# Patient Record
Sex: FEMALE | Race: WHITE | Employment: OTHER | URBAN - METROPOLITAN AREA
[De-identification: names, ages, dates, MRNs, and addresses within clinical notes are randomized per-mention and may not be internally consistent; named-entity substitution may affect disease eponyms.]

---

## 2017-11-06 ENCOUNTER — OFFICE VISIT (OUTPATIENT)
Dept: FAMILY MEDICINE CLINIC | Age: 62
End: 2017-11-06

## 2017-11-06 ENCOUNTER — TELEPHONE (OUTPATIENT)
Dept: FAMILY MEDICINE CLINIC | Age: 62
End: 2017-11-06

## 2017-11-06 VITALS
BODY MASS INDEX: 28.44 KG/M2 | WEIGHT: 192 LBS | SYSTOLIC BLOOD PRESSURE: 128 MMHG | OXYGEN SATURATION: 98 % | TEMPERATURE: 96.7 F | HEART RATE: 68 BPM | DIASTOLIC BLOOD PRESSURE: 80 MMHG | RESPIRATION RATE: 14 BRPM | HEIGHT: 69 IN

## 2017-11-06 DIAGNOSIS — E78.2 MIXED HYPERLIPIDEMIA: Primary | ICD-10-CM

## 2017-11-06 DIAGNOSIS — Z13.1 SCREENING FOR DIABETES MELLITUS: ICD-10-CM

## 2017-11-06 DIAGNOSIS — N95.1 MENOPAUSAL SYMPTOMS: ICD-10-CM

## 2017-11-06 DIAGNOSIS — E78.2 MIXED HYPERLIPIDEMIA: ICD-10-CM

## 2017-11-06 DIAGNOSIS — Z98.890 HISTORY OF FACELIFT: Primary | ICD-10-CM

## 2017-11-06 DIAGNOSIS — L98.8 AGE-RELATED FACIAL WRINKLES: ICD-10-CM

## 2017-11-06 DIAGNOSIS — Z12.31 ENCOUNTER FOR SCREENING MAMMOGRAM FOR BREAST CANCER: ICD-10-CM

## 2017-11-06 LAB
ALBUMIN SERPL-MCNC: 4.7 G/DL (ref 3.9–4.9)
ALP BLD-CCNC: 53 U/L (ref 40–130)
ALT SERPL-CCNC: 18 U/L (ref 0–33)
ANION GAP SERPL CALCULATED.3IONS-SCNC: 16 MEQ/L (ref 7–13)
AST SERPL-CCNC: 22 U/L (ref 0–35)
BILIRUB SERPL-MCNC: 0.4 MG/DL (ref 0–1.2)
BUN BLDV-MCNC: 11 MG/DL (ref 8–23)
CALCIUM SERPL-MCNC: 10 MG/DL (ref 8.6–10.2)
CHLORIDE BLD-SCNC: 103 MEQ/L (ref 98–107)
CHOLESTEROL, TOTAL: 237 MG/DL (ref 0–199)
CO2: 24 MEQ/L (ref 22–29)
CREAT SERPL-MCNC: 0.6 MG/DL (ref 0.5–0.9)
GFR AFRICAN AMERICAN: >60
GFR NON-AFRICAN AMERICAN: >60
GLOBULIN: 3 G/DL (ref 2.3–3.5)
GLUCOSE BLD-MCNC: 94 MG/DL (ref 74–109)
HCT VFR BLD CALC: 43.1 % (ref 37–47)
HDLC SERPL-MCNC: 86 MG/DL (ref 40–59)
HEMOGLOBIN: 14.5 G/DL (ref 12–16)
LDL CHOLESTEROL CALCULATED: 122 MG/DL (ref 0–129)
MCH RBC QN AUTO: 30.7 PG (ref 27–31.3)
MCHC RBC AUTO-ENTMCNC: 33.7 % (ref 33–37)
MCV RBC AUTO: 91.2 FL (ref 82–100)
PDW BLD-RTO: 13.8 % (ref 11.5–14.5)
PLATELET # BLD: 205 K/UL (ref 130–400)
POTASSIUM SERPL-SCNC: 5.1 MEQ/L (ref 3.5–5.1)
RBC # BLD: 4.72 M/UL (ref 4.2–5.4)
SODIUM BLD-SCNC: 143 MEQ/L (ref 132–144)
TOTAL PROTEIN: 7.7 G/DL (ref 6.4–8.1)
TRIGL SERPL-MCNC: 145 MG/DL (ref 0–200)
WBC # BLD: 5.4 K/UL (ref 4.8–10.8)

## 2017-11-06 PROCEDURE — G8484 FLU IMMUNIZE NO ADMIN: HCPCS | Performed by: NURSE PRACTITIONER

## 2017-11-06 PROCEDURE — G8427 DOCREV CUR MEDS BY ELIG CLIN: HCPCS | Performed by: NURSE PRACTITIONER

## 2017-11-06 PROCEDURE — 3017F COLORECTAL CA SCREEN DOC REV: CPT | Performed by: NURSE PRACTITIONER

## 2017-11-06 PROCEDURE — G8419 CALC BMI OUT NRM PARAM NOF/U: HCPCS | Performed by: NURSE PRACTITIONER

## 2017-11-06 PROCEDURE — 4004F PT TOBACCO SCREEN RCVD TLK: CPT | Performed by: NURSE PRACTITIONER

## 2017-11-06 PROCEDURE — 3014F SCREEN MAMMO DOC REV: CPT | Performed by: NURSE PRACTITIONER

## 2017-11-06 PROCEDURE — 99203 OFFICE O/P NEW LOW 30 MIN: CPT | Performed by: NURSE PRACTITIONER

## 2017-11-06 RX ORDER — ESTRADIOL 0.1 MG/D
1 FILM, EXTENDED RELEASE TRANSDERMAL
Qty: 13 PATCH | Refills: 1 | Status: SHIPPED | OUTPATIENT
Start: 2017-11-06 | End: 2017-11-09 | Stop reason: SDUPTHER

## 2017-11-06 RX ORDER — OMEGA-3-ACID ETHYL ESTERS 1 G/1
2 CAPSULE, LIQUID FILLED ORAL 2 TIMES DAILY
Qty: 160 CAPSULE | Refills: 1 | Status: SHIPPED | OUTPATIENT
Start: 2017-11-06 | End: 2017-11-06 | Stop reason: SDUPTHER

## 2017-11-06 RX ORDER — TRETINOIN 1 MG/G
CREAM TOPICAL
Qty: 45 G | Refills: 3 | Status: SHIPPED
Start: 2017-11-06 | End: 2020-04-06 | Stop reason: CLARIF

## 2017-11-06 RX ORDER — ESTRADIOL 0.1 MG/D
1 FILM, EXTENDED RELEASE TRANSDERMAL
Qty: 24 PATCH | Refills: 1 | Status: SHIPPED | OUTPATIENT
Start: 2017-11-06 | End: 2017-11-06 | Stop reason: SDUPTHER

## 2017-11-06 RX ORDER — OMEGA-3-ACID ETHYL ESTERS 1 G/1
2 CAPSULE, LIQUID FILLED ORAL 2 TIMES DAILY
Qty: 180 CAPSULE | Refills: 1 | Status: SHIPPED | OUTPATIENT
Start: 2017-11-06 | End: 2017-11-08 | Stop reason: SDUPTHER

## 2017-11-06 ASSESSMENT — PATIENT HEALTH QUESTIONNAIRE - PHQ9
SUM OF ALL RESPONSES TO PHQ9 QUESTIONS 1 & 2: 0
1. LITTLE INTEREST OR PLEASURE IN DOING THINGS: 0
2. FEELING DOWN, DEPRESSED OR HOPELESS: 0
SUM OF ALL RESPONSES TO PHQ QUESTIONS 1-9: 0

## 2017-11-06 NOTE — TELEPHONE ENCOUNTER
Pt is requesting th miguel and José Chavira go to [address] instead of [facility]. Pt has enough meds on hand to cover until mail order arrives. Thanks.

## 2017-11-08 DIAGNOSIS — E78.2 MIXED HYPERLIPIDEMIA: ICD-10-CM

## 2017-11-08 RX ORDER — OMEGA-3-ACID ETHYL ESTERS 1 G/1
2 CAPSULE, LIQUID FILLED ORAL 2 TIMES DAILY
Qty: 360 CAPSULE | Refills: 1 | Status: SHIPPED | OUTPATIENT
Start: 2017-11-08 | End: 2018-05-24 | Stop reason: SDUPTHER

## 2017-11-08 ASSESSMENT — ENCOUNTER SYMPTOMS
RESPIRATORY NEGATIVE: 1
RECTAL PAIN: 0
TROUBLE SWALLOWING: 0
ABDOMINAL PAIN: 0
GASTROINTESTINAL NEGATIVE: 1
VOICE CHANGE: 0
DIARRHEA: 0
CONSTIPATION: 0
ANAL BLEEDING: 0
EYES NEGATIVE: 1
SHORTNESS OF BREATH: 0
COLOR CHANGE: 0
BLOOD IN STOOL: 0
ALLERGIC/IMMUNOLOGIC NEGATIVE: 1

## 2017-11-08 NOTE — PROGRESS NOTES
Subjective  Lakeshia Correa, 60 y.o. female presents today with:  Chief Complaint   Patient presents with   Ifrah Tobias Establish Care     refill on meds        Establish care  Presents today to establish care. Previous PCP was in [place]. she chose to come here because she is living back in the area. She has a history of hyperlipidemia-  Diet controlled. Face lift-  Uses retina A daily. - and vasomotor menopausal symptoms-  Controlled with estradiol patch. Is currently taking all medications at home. Due for Mammogram.  Overall is doing well. Has NO other questions or concerns at this time. Review of Systems   Constitutional: Negative. Negative for activity change, appetite change, fatigue and unexpected weight change. HENT: Negative. Negative for dental problem, nosebleeds, trouble swallowing and voice change. Eyes: Negative. Negative for visual disturbance. Respiratory: Negative. Negative for shortness of breath. Cardiovascular: Negative. Negative for chest pain, palpitations and leg swelling. Gastrointestinal: Negative. Negative for abdominal pain, anal bleeding, blood in stool, constipation, diarrhea and rectal pain. Endocrine: Negative. Negative for cold intolerance, heat intolerance, polydipsia, polyphagia and polyuria. Genitourinary: Negative. Musculoskeletal: Negative. Skin: Negative. Negative for color change and rash. Allergic/Immunologic: Negative. Neurological: Negative. Negative for dizziness, syncope, weakness and headaches. Hematological: Negative. Negative for adenopathy. Does not bruise/bleed easily. Psychiatric/Behavioral: Negative. Negative for dysphoric mood and sleep disturbance. The patient is not nervous/anxious. History reviewed. No pertinent past medical history.   Past Surgical History:   Procedure Laterality Date    APPENDECTOMY  64    NIMESH AND BSO  1995     Social History     Social History    Marital status:  Spouse name: N/A    Number of children: N/A    Years of education: N/A     Occupational History    Not on file. Social History Main Topics    Smoking status: Not on file    Smokeless tobacco: Not on file    Alcohol use Not on file    Drug use: Unknown    Sexual activity: Not on file     Other Topics Concern    Not on file     Social History Narrative    No narrative on file     Family History   Problem Relation Age of Onset    Osteoarthritis Mother     Migraines Mother     Heart Failure Father     Osteoarthritis Father      No Known Allergies  Current Outpatient Prescriptions   Medication Sig Dispense Refill    tretinoin (RETIN-A) 0.1 % cream Apply topically nightly. 45 g 3    omega-3 acid ethyl esters (LOVAZA) 1 g capsule Take 2 capsules by mouth 2 times daily 360 capsule 1    estradiol (VIVELLE) 0.1 MG/24HR Place 1 patch onto the skin Twice a Week 13 patch 1     No current facility-administered medications for this visit. PMH, Surgical Hx, Family Hx, and Social Hx reviewed and updated. Health Maintenance reviewed. Objective    Vitals:    11/06/17 0913   BP: 128/80   Site: Right Arm   Position: Sitting   Cuff Size: Medium Adult   Pulse: 68   Resp: 14   Temp: 96.7 °F (35.9 °C)   TempSrc: Tympanic   SpO2: 98%   Weight: 192 lb (87.1 kg)   Height: 5' 9\" (1.753 m)       Physical Exam   Constitutional: She is oriented to person, place, and time. Vital signs are normal. She appears well-developed and well-nourished. She is cooperative. Non-toxic appearance. She does not have a sickly appearance. She does not appear ill. No distress. HENT:   Head: Normocephalic and atraumatic. Right Ear: Tympanic membrane, external ear and ear canal normal.   Left Ear: Tympanic membrane, external ear and ear canal normal.   Nose: Nose normal.   Mouth/Throat: Oropharynx is clear and moist.   Eyes: Conjunctivae, EOM and lids are normal. Pupils are equal, round, and reactive to light.  Right eye exhibits no nystagmus. Left eye exhibits no nystagmus. Neck: Trachea normal and normal range of motion. Neck supple. No JVD present. Carotid bruit is not present. No tracheal deviation present. No thyroid mass and no thyromegaly present. Cardiovascular: Normal rate, regular rhythm, S1 normal, S2 normal, normal heart sounds and intact distal pulses. Exam reveals no gallop. No murmur heard. Pulmonary/Chest: Effort normal and breath sounds normal. No accessory muscle usage. No respiratory distress. She has no decreased breath sounds. She has no wheezes. She has no rhonchi. She has no rales. Abdominal: Soft. Bowel sounds are normal. She exhibits no distension, no ascites and no mass. There is no splenomegaly or hepatomegaly. There is no tenderness. There is no CVA tenderness. No hernia. Lymphadenopathy:        Head (right side): No submandibular, no tonsillar, no preauricular and no posterior auricular adenopathy present. Head (left side): No submandibular, no tonsillar, no preauricular and no posterior auricular adenopathy present. She has no cervical adenopathy. She has no axillary adenopathy. Neurological: She is alert and oriented to person, place, and time. She has normal strength and normal reflexes. She displays no atrophy and no tremor. No cranial nerve deficit or sensory deficit. She exhibits normal muscle tone. She displays a negative Romberg sign. She displays no seizure activity. Coordination and gait normal. GCS eye subscore is 4. GCS verbal subscore is 5. GCS motor subscore is 6. Skin: Skin is warm, dry and intact. She is not diaphoretic. No pallor. Psychiatric: She has a normal mood and affect. Her speech is normal.       Assessment & Plan   Leann Zarco was seen today for establish care. Diagnoses and all orders for this visit:    Mixed hyperlipidemia  -     Discontinue: omega-3 acid ethyl esters (LOVAZA) 1 g capsule;  Take 2 capsules by mouth 2 times daily  -     Lipid Panel; Future  -     Comprehensive Metabolic Panel; Future  -     CBC; Future    Menopausal symptoms  -     Discontinue: estradiol (VIVELLE) 0.1 MG/24HR; Place 1 patch onto the skin Twice a Week  -     Comprehensive Metabolic Panel; Future  -     CBC; Future    Encounter for screening mammogram for breast cancer  -     [facility] Digital Screen Bilateral [[id number]]; Future    Screening for diabetes mellitus  -     [facility] Digital Screen Bilateral [[id number]]; Future    Other orders  -     Cancel: ZOILA Digital Screen Bilateral [[id number]]; Future  -     tretinoin (RETIN-A) 0.1 % cream; Apply topically nightly. Orders Placed This Encounter   Procedures    ZOILA Digital Screen Bilateral [[id number]]     Standing Status:   Future     Standing Expiration Date:   11/3/2018    Lipid Panel     Standing Status:   Future     Number of Occurrences:   1     Standing Expiration Date:   11/6/2018     Order Specific Question:   Is Patient Fasting?/# of Hours     Answer:   y    Comprehensive Metabolic Panel     Standing Status:   Future     Number of Occurrences:   1     Standing Expiration Date:   11/6/2018    CBC     Standing Status:   Future     Number of Occurrences:   1     Standing Expiration Date:   11/6/2018     Orders Placed This Encounter   Medications    DISCONTD: estradiol (VIVELLE) 0.1 MG/24HR     Sig: Place 1 patch onto the skin Twice a Week     Dispense:  24 patch     Refill:  1    DISCONTD: omega-3 acid ethyl esters (LOVAZA) 1 g capsule     Sig: Take 2 capsules by mouth 2 times daily     Dispense:  160 capsule     Refill:  1    tretinoin (RETIN-A) 0.1 % cream     Sig: Apply topically nightly. Dispense:  45 g     Refill:  3     Medications Discontinued During This Encounter   Medication Reason    estradiol (VIVELLE) 0.1 MG/24HR Reorder     Return for [place]. Reviewed with the patient: current clinical status, medications, activities and diet.      Side effects, adverse effects of the medication prescribed today, as well

## 2017-11-20 ENCOUNTER — HOSPITAL ENCOUNTER (OUTPATIENT)
Dept: WOMENS IMAGING | Age: 62
Discharge: HOME | End: 2017-11-20
Payer: COMMERCIAL

## 2017-11-20 DIAGNOSIS — Z12.31 ENCOUNTER FOR SCREENING MAMMOGRAM FOR BREAST CANCER: ICD-10-CM

## 2017-11-20 DIAGNOSIS — Z13.1 SCREENING FOR DIABETES MELLITUS: ICD-10-CM

## 2017-11-20 PROCEDURE — G0202 SCR MAMMO BI INCL CAD: HCPCS

## 2017-11-28 DIAGNOSIS — R92.8 ABNORMAL MAMMOGRAM OF RIGHT BREAST: Primary | ICD-10-CM

## 2017-12-01 ENCOUNTER — HOSPITAL ENCOUNTER (OUTPATIENT)
Dept: ULTRASOUND IMAGING | Age: 62
End: 2017-12-01
Payer: COMMERCIAL

## 2017-12-01 ENCOUNTER — HOSPITAL ENCOUNTER (OUTPATIENT)
Dept: WOMENS IMAGING | Age: 62
Discharge: HOME | End: 2017-12-01
Payer: COMMERCIAL

## 2017-12-01 DIAGNOSIS — R92.8 ABNORMAL MAMMOGRAM OF RIGHT BREAST: ICD-10-CM

## 2017-12-01 PROCEDURE — G0206 DX MAMMO INCL CAD UNI: HCPCS

## 2017-12-01 NOTE — LETTER
[address] [facility]  [address] [address]  Phone: [address] [facility] room 2        December 1, 2017    [address] [place] [id number]      Dear Nico Duron:    According to the radiologists interpretation, your mammogram did not show any significant findings. Please remember that some cancers (about 10-15%) cannot be found by mammography alone, and that early detection requires a combination of monthly self-examination, yearly physical examination, and periodic mammography. The American Cancer Society Guidelines recommend screening mammograms and physical breast examinations every year beginning at the age of 36. Please continue regular breast self-examinations and report any changes that concern you, even before your next appointment. If you have any further questions, please don't hesitate to call the office.     Sincerely,       Kwabena Borjas MD

## 2018-02-26 ENCOUNTER — OFFICE VISIT (OUTPATIENT)
Dept: FAMILY MEDICINE CLINIC | Age: 63
End: 2018-02-26
Payer: COMMERCIAL

## 2018-02-26 VITALS
DIASTOLIC BLOOD PRESSURE: 70 MMHG | HEART RATE: 77 BPM | OXYGEN SATURATION: 97 % | SYSTOLIC BLOOD PRESSURE: 118 MMHG | RESPIRATION RATE: 14 BRPM | WEIGHT: 162 LBS | BODY MASS INDEX: 23.99 KG/M2 | HEIGHT: 69 IN | TEMPERATURE: 97.9 F

## 2018-02-26 DIAGNOSIS — J40 BRONCHITIS: ICD-10-CM

## 2018-02-26 DIAGNOSIS — B96.89 ACUTE BACTERIAL SINUSITIS: Primary | ICD-10-CM

## 2018-02-26 DIAGNOSIS — R09.81 SINUS CONGESTION: ICD-10-CM

## 2018-02-26 DIAGNOSIS — J01.90 ACUTE BACTERIAL SINUSITIS: Primary | ICD-10-CM

## 2018-02-26 LAB
INFLUENZA A ANTIBODY: NORMAL
INFLUENZA B ANTIBODY: NORMAL

## 2018-02-26 PROCEDURE — G8420 CALC BMI NORM PARAMETERS: HCPCS | Performed by: NURSE PRACTITIONER

## 2018-02-26 PROCEDURE — 1036F TOBACCO NON-USER: CPT | Performed by: NURSE PRACTITIONER

## 2018-02-26 PROCEDURE — G8484 FLU IMMUNIZE NO ADMIN: HCPCS | Performed by: NURSE PRACTITIONER

## 2018-02-26 PROCEDURE — 3017F COLORECTAL CA SCREEN DOC REV: CPT | Performed by: NURSE PRACTITIONER

## 2018-02-26 PROCEDURE — 87804 INFLUENZA ASSAY W/OPTIC: CPT | Performed by: NURSE PRACTITIONER

## 2018-02-26 PROCEDURE — 3014F SCREEN MAMMO DOC REV: CPT | Performed by: NURSE PRACTITIONER

## 2018-02-26 PROCEDURE — 99213 OFFICE O/P EST LOW 20 MIN: CPT | Performed by: NURSE PRACTITIONER

## 2018-02-26 PROCEDURE — G8427 DOCREV CUR MEDS BY ELIG CLIN: HCPCS | Performed by: NURSE PRACTITIONER

## 2018-02-26 RX ORDER — GUAIFENESIN AND CODEINE PHOSPHATE 100; 10 MG/5ML; MG/5ML
5 SOLUTION ORAL EVERY 6 HOURS PRN
Qty: 180 ML | Refills: 0 | Status: SHIPPED | OUTPATIENT
Start: 2018-02-26 | End: 2018-02-26 | Stop reason: SDUPTHER

## 2018-02-26 RX ORDER — PREDNISONE 20 MG/1
20 TABLET ORAL DAILY
Qty: 5 TABLET | Refills: 0 | Status: SHIPPED | OUTPATIENT
Start: 2018-02-26 | End: 2018-02-26 | Stop reason: SDUPTHER

## 2018-02-26 RX ORDER — FLUCONAZOLE 150 MG/1
150 TABLET ORAL ONCE
Qty: 1 TABLET | Refills: 0 | Status: SHIPPED | OUTPATIENT
Start: 2018-02-26 | End: 2018-02-26

## 2018-02-26 RX ORDER — PREDNISONE 20 MG/1
20 TABLET ORAL DAILY
Qty: 5 TABLET | Refills: 0 | Status: SHIPPED | OUTPATIENT
Start: 2018-02-26 | End: 2018-03-03

## 2018-02-26 RX ORDER — GUAIFENESIN AND CODEINE PHOSPHATE 100; 10 MG/5ML; MG/5ML
5 SOLUTION ORAL EVERY 6 HOURS PRN
Qty: 180 ML | Refills: 0 | Status: SHIPPED | OUTPATIENT
Start: 2018-02-26 | End: 2018-03-05

## 2018-03-05 ASSESSMENT — ENCOUNTER SYMPTOMS
SHORTNESS OF BREATH: 0
COUGH: 1
WHEEZING: 1
RHINORRHEA: 1
SINUS PRESSURE: 1
SORE THROAT: 0
HEMOPTYSIS: 0
HOARSE VOICE: 0
HEARTBURN: 0
SWOLLEN GLANDS: 0

## 2018-03-05 NOTE — PROGRESS NOTES
no maxillary sinus tenderness. Left sinus exhibits frontal sinus tenderness. Left sinus exhibits no maxillary sinus tenderness. Mouth/Throat: Uvula is midline and mucous membranes are normal. Posterior oropharyngeal erythema present. No oropharyngeal exudate. Eyes: Conjunctivae are normal. Pupils are equal, round, and reactive to light. Neck: Neck supple. Cardiovascular: Normal rate, regular rhythm and intact distal pulses. Pulmonary/Chest: Effort normal and breath sounds normal. No respiratory distress. She has no wheezes. She has no rales. Lymphadenopathy:     She has cervical adenopathy. Neurological: She is alert and oriented to person, place, and time. Skin: Skin is warm and dry. No rash noted. Psychiatric: She has a normal mood and affect. Assessment & Plan   Yenni Sher was seen today for congestion, cough and fever. Diagnoses and all orders for this visit:    Acute bacterial sinusitis  -     Discontinue: predniSONE (DELTASONE) 20 MG tablet; Take 1 tablet by mouth daily for 5 days  -     Discontinue: guaiFENesin-codeine (CHERATUSSIN AC) 100-10 MG/5ML syrup; Take 5 mLs by mouth every 6 hours as needed for Cough for up to 7 days. -     predniSONE (DELTASONE) 20 MG tablet; Take 1 tablet by mouth daily for 5 days  -     guaiFENesin-codeine (CHERATUSSIN AC) 100-10 MG/5ML syrup; Take 5 mLs by mouth every 6 hours as needed for Cough for up to 7 days. Bronchitis  -     Discontinue: predniSONE (DELTASONE) 20 MG tablet; Take 1 tablet by mouth daily for 5 days  -     Discontinue: guaiFENesin-codeine (CHERATUSSIN AC) 100-10 MG/5ML syrup; Take 5 mLs by mouth every 6 hours as needed for Cough for up to 7 days. -     predniSONE (DELTASONE) 20 MG tablet; Take 1 tablet by mouth daily for 5 days  -     guaiFENesin-codeine (CHERATUSSIN AC) 100-10 MG/5ML syrup; Take 5 mLs by mouth every 6 hours as needed for Cough for up to 7 days.     Sinus congestion  -     POCT Influenza A/B  -     XR CHEST STANDARD (2 VW); Future    Other orders  -     fluconazole (DIFLUCAN) 150 MG tablet; Take 1 tablet by mouth once for 1 dose      Orders Placed This Encounter   Procedures    XR CHEST STANDARD (2 VW)     Standing Status:   Future     Number of Occurrences:   1     Standing Expiration Date:   2/26/2019     Order Specific Question:   Reason for exam:     Answer:   chest congestion    POCT Influenza A/B     Orders Placed This Encounter   Medications    DISCONTD: predniSONE (DELTASONE) 20 MG tablet     Sig: Take 1 tablet by mouth daily for 5 days     Dispense:  5 tablet     Refill:  0    DISCONTD: guaiFENesin-codeine (CHERATUSSIN AC) 100-10 MG/5ML syrup     Sig: Take 5 mLs by mouth every 6 hours as needed for Cough for up to 7 days. Dispense:  180 mL     Refill:  0    fluconazole (DIFLUCAN) 150 MG tablet     Sig: Take 1 tablet by mouth once for 1 dose     Dispense:  1 tablet     Refill:  0    predniSONE (DELTASONE) 20 MG tablet     Sig: Take 1 tablet by mouth daily for 5 days     Dispense:  5 tablet     Refill:  0    guaiFENesin-codeine (CHERATUSSIN AC) 100-10 MG/5ML syrup     Sig: Take 5 mLs by mouth every 6 hours as needed for Cough for up to 7 days. Dispense:  180 mL     Refill:  0     Medications Discontinued During This Encounter   Medication Reason    predniSONE (DELTASONE) 20 MG tablet Reorder    guaiFENesin-codeine (CHERATUSSIN AC) 100-10 MG/5ML syrup Reorder     Return if symptoms worsen or fail to improve. Reviewed with the patient: current clinical status, medications, activities and diet. Side effects, adverse effects of the medication prescribed today, as well as treatment plan/ rationale and result expectations have been discussed with the patient who expresses understanding and desires to proceed. Close follow up to evaluate treatment results and for coordination of care.   I have reviewed the patient's medical history in detail and updated the computerized patient

## 2018-05-24 DIAGNOSIS — E78.2 MIXED HYPERLIPIDEMIA: ICD-10-CM

## 2018-05-25 RX ORDER — OMEGA-3-ACID ETHYL ESTERS 1 G/1
CAPSULE, LIQUID FILLED ORAL
Qty: 360 CAPSULE | Refills: 1 | Status: SHIPPED | OUTPATIENT
Start: 2018-05-25 | End: 2018-12-01 | Stop reason: SDUPTHER

## 2018-12-01 DIAGNOSIS — E78.2 MIXED HYPERLIPIDEMIA: ICD-10-CM

## 2018-12-04 RX ORDER — OMEGA-3-ACID ETHYL ESTERS 1 G/1
CAPSULE, LIQUID FILLED ORAL
Qty: 360 CAPSULE | Refills: 1 | Status: SHIPPED | OUTPATIENT
Start: 2018-12-04 | End: 2019-06-17 | Stop reason: SDUPTHER

## 2019-06-17 DIAGNOSIS — E78.2 MIXED HYPERLIPIDEMIA: ICD-10-CM

## 2019-06-17 RX ORDER — OMEGA-3-ACID ETHYL ESTERS 1 G/1
CAPSULE, LIQUID FILLED ORAL
Qty: 360 CAPSULE | Refills: 1 | Status: SHIPPED | OUTPATIENT
Start: 2019-06-17 | End: 2020-04-06 | Stop reason: CLARIF

## 2019-06-17 NOTE — TELEPHONE ENCOUNTER
Pharmacy requesting medication refill. Please approve or deny this request.    Rx requested:  Requested Prescriptions     Pending Prescriptions Disp Refills    omega-3 acid ethyl esters (LOVAZA) 1 g capsule [Pharmacy Med Name: OMEGA 3 ACID ETHYL PATRICIA CAPS 1GM] 360 capsule 1     Sig: TAKE 2 CAPSULES TWICE A DAY         Last Office Visit:   2/26/2018      Next Visit Date:  No future appointments.

## 2019-06-20 DIAGNOSIS — N95.1 MENOPAUSAL SYMPTOMS: ICD-10-CM

## 2019-06-20 RX ORDER — ESTRADIOL 0.1 MG/D
FILM, EXTENDED RELEASE TRANSDERMAL
Qty: 24 PATCH | Refills: 0 | Status: SHIPPED | OUTPATIENT
Start: 2019-06-20 | End: 2020-03-31 | Stop reason: SDUPTHER

## 2019-06-20 NOTE — TELEPHONE ENCOUNTER
Pt requesting medication refill.      Rx requested:  Requested Prescriptions     Pending Prescriptions Disp Refills    estradiol (VIVELLE) 0.1 MG/24HR 24 patch 3     Sig: PLACE 1 PATCH ON THE SKIN TWICE A WEEK       Last Office Visit:   Visit date not found    Last Tox screen:    N/A    Last Medication contract:    N/A    Next Visit Date:  Future Appointments   Date Time Provider Mirlande eBltrán   7/17/2019  3:00 PM Ryan Duckworth  [place], [place]

## 2019-07-17 ENCOUNTER — OFFICE VISIT (OUTPATIENT)
Dept: FAMILY MEDICINE CLINIC | Age: 64
End: 2019-07-17
Payer: MEDICARE

## 2019-07-17 VITALS
BODY MASS INDEX: 24.88 KG/M2 | SYSTOLIC BLOOD PRESSURE: 134 MMHG | TEMPERATURE: 97.5 F | WEIGHT: 168 LBS | RESPIRATION RATE: 15 BRPM | HEART RATE: 56 BPM | HEIGHT: 69 IN | DIASTOLIC BLOOD PRESSURE: 82 MMHG | OXYGEN SATURATION: 98 %

## 2019-07-17 DIAGNOSIS — R03.0 ELEVATED BLOOD PRESSURE READING IN OFFICE WITHOUT DIAGNOSIS OF HYPERTENSION: ICD-10-CM

## 2019-07-17 DIAGNOSIS — E78.5 HYPERLIPIDEMIA, UNSPECIFIED HYPERLIPIDEMIA TYPE: ICD-10-CM

## 2019-07-17 DIAGNOSIS — Z12.11 SCREENING FOR COLON CANCER: ICD-10-CM

## 2019-07-17 DIAGNOSIS — Z78.0 POST-MENOPAUSAL: Primary | ICD-10-CM

## 2019-07-17 DIAGNOSIS — E55.9 VITAMIN D DEFICIENCY: ICD-10-CM

## 2019-07-17 DIAGNOSIS — Z12.39 SCREENING FOR BREAST CANCER: ICD-10-CM

## 2019-07-17 PROCEDURE — 1036F TOBACCO NON-USER: CPT | Performed by: INTERNAL MEDICINE

## 2019-07-17 PROCEDURE — 99213 OFFICE O/P EST LOW 20 MIN: CPT | Performed by: INTERNAL MEDICINE

## 2019-07-17 PROCEDURE — 1090F PRES/ABSN URINE INCON ASSESS: CPT | Performed by: INTERNAL MEDICINE

## 2019-07-17 PROCEDURE — 3017F COLORECTAL CA SCREEN DOC REV: CPT | Performed by: INTERNAL MEDICINE

## 2019-07-17 PROCEDURE — G8420 CALC BMI NORM PARAMETERS: HCPCS | Performed by: INTERNAL MEDICINE

## 2019-07-17 PROCEDURE — G8427 DOCREV CUR MEDS BY ELIG CLIN: HCPCS | Performed by: INTERNAL MEDICINE

## 2019-07-17 PROCEDURE — 1123F ACP DISCUSS/DSCN MKR DOCD: CPT | Performed by: INTERNAL MEDICINE

## 2019-07-17 PROCEDURE — G8400 PT W/DXA NO RESULTS DOC: HCPCS | Performed by: INTERNAL MEDICINE

## 2019-07-17 PROCEDURE — 4040F PNEUMOC VAC/ADMIN/RCVD: CPT | Performed by: INTERNAL MEDICINE

## 2019-07-17 ASSESSMENT — PATIENT HEALTH QUESTIONNAIRE - PHQ9
SUM OF ALL RESPONSES TO PHQ QUESTIONS 1-9: 0
1. LITTLE INTEREST OR PLEASURE IN DOING THINGS: 0
SUM OF ALL RESPONSES TO PHQ9 QUESTIONS 1 & 2: 0
SUM OF ALL RESPONSES TO PHQ QUESTIONS 1-9: 0
2. FEELING DOWN, DEPRESSED OR HOPELESS: 0

## 2019-07-17 ASSESSMENT — ENCOUNTER SYMPTOMS
EYE PAIN: 0
ABDOMINAL PAIN: 0
BACK PAIN: 0
SHORTNESS OF BREATH: 0

## 2019-07-17 NOTE — PROGRESS NOTES
Allergies  Current Outpatient Medications on File Prior to Visit   Medication Sig Dispense Refill    estradiol (VIVELLE) 0.1 MG/24HR PLACE 1 PATCH ON THE SKIN TWICE A WEEK 24 patch 0    omega-3 acid ethyl esters (LOVAZA) 1 g capsule TAKE 2 CAPSULES TWICE A  capsule 1    tretinoin (RETIN-A) 0.1 % cream Apply topically nightly. 45 g 3     No current facility-administered medications on file prior to visit. I have personally reviewed the ROS, PMH, PFH, and social history     Review of Systems   Constitutional: Negative for chills and fever. HENT: Negative for congestion. Eyes: Negative for pain. Respiratory: Negative for shortness of breath. Cardiovascular: Negative for chest pain. Gastrointestinal: Negative for abdominal pain. Genitourinary: Negative for hematuria. Musculoskeletal: Negative for back pain. Allergic/Immunologic: Negative for immunocompromised state. Neurological: Negative for headaches. Psychiatric/Behavioral: Negative for hallucinations. Objective:   /82 (Site: Left Upper Arm, Position: Sitting, Cuff Size: Large Adult)   Pulse 56   Temp 97.5 °F (36.4 °C) (Tympanic)   Resp 15   Ht 5' 9\" (1.753 m)   Wt 168 lb (76.2 kg)   SpO2 98%   BMI 24.81 kg/m²     Physical Exam   Constitutional: She is oriented to person, place, and time. She appears well-developed and well-nourished. HENT:   Head: Normocephalic. Eyes: Pupils are equal, round, and reactive to light. Neck: No tracheal deviation present. Cardiovascular: Normal rate, regular rhythm and normal heart sounds. Exam reveals no gallop and no friction rub. No murmur heard. Pulmonary/Chest: No respiratory distress. Abdominal: Soft. Bowel sounds are normal. She exhibits no distension. There is no rebound. Musculoskeletal: She exhibits no edema. Neurological: She is oriented to person, place, and time. Skin: Skin is warm and dry. Assessment:       Diagnosis Orders   1.

## 2019-07-18 DIAGNOSIS — E55.9 VITAMIN D DEFICIENCY: ICD-10-CM

## 2019-07-18 DIAGNOSIS — Z78.0 POST-MENOPAUSAL: ICD-10-CM

## 2019-07-18 DIAGNOSIS — R03.0 ELEVATED BLOOD PRESSURE READING IN OFFICE WITHOUT DIAGNOSIS OF HYPERTENSION: ICD-10-CM

## 2019-07-18 DIAGNOSIS — E78.5 HYPERLIPIDEMIA, UNSPECIFIED HYPERLIPIDEMIA TYPE: ICD-10-CM

## 2019-07-18 LAB
ALBUMIN SERPL-MCNC: 4.8 G/DL (ref 3.5–4.6)
ALP BLD-CCNC: 49 U/L (ref 40–130)
ALT SERPL-CCNC: 25 U/L (ref 0–33)
ANION GAP SERPL CALCULATED.3IONS-SCNC: 11 MEQ/L (ref 9–15)
AST SERPL-CCNC: 25 U/L (ref 0–35)
BASOPHILS ABSOLUTE: 0.1 K/UL (ref 0–0.2)
BASOPHILS RELATIVE PERCENT: 1 %
BILIRUB SERPL-MCNC: 0.4 MG/DL (ref 0.2–0.7)
BUN BLDV-MCNC: 13 MG/DL (ref 8–23)
CALCIUM SERPL-MCNC: 10.1 MG/DL (ref 8.5–9.9)
CHLORIDE BLD-SCNC: 104 MEQ/L (ref 95–107)
CHOLESTEROL, TOTAL: 250 MG/DL (ref 0–199)
CO2: 27 MEQ/L (ref 20–31)
CREAT SERPL-MCNC: 0.82 MG/DL (ref 0.5–0.9)
EOSINOPHILS ABSOLUTE: 0.2 K/UL (ref 0–0.7)
EOSINOPHILS RELATIVE PERCENT: 3.5 %
GFR AFRICAN AMERICAN: >60
GFR NON-AFRICAN AMERICAN: >60
GLOBULIN: 2.8 G/DL (ref 2.3–3.5)
GLUCOSE BLD-MCNC: 98 MG/DL (ref 70–99)
HCT VFR BLD CALC: 42.7 % (ref 37–47)
HDLC SERPL-MCNC: 90 MG/DL (ref 40–59)
HEMOGLOBIN: 14.9 G/DL (ref 12–16)
LDL CHOLESTEROL CALCULATED: 136 MG/DL (ref 0–129)
LYMPHOCYTES ABSOLUTE: 1.5 K/UL (ref 1–4.8)
LYMPHOCYTES RELATIVE PERCENT: 30.4 %
MCH RBC QN AUTO: 31.9 PG (ref 27–31.3)
MCHC RBC AUTO-ENTMCNC: 34.9 % (ref 33–37)
MCV RBC AUTO: 91.3 FL (ref 82–100)
MONOCYTES ABSOLUTE: 0.4 K/UL (ref 0.2–0.8)
MONOCYTES RELATIVE PERCENT: 8.2 %
NEUTROPHILS ABSOLUTE: 2.8 K/UL (ref 1.4–6.5)
NEUTROPHILS RELATIVE PERCENT: 56.9 %
PDW BLD-RTO: 13.6 % (ref 11.5–14.5)
PLATELET # BLD: 214 K/UL (ref 130–400)
POTASSIUM SERPL-SCNC: 4.9 MEQ/L (ref 3.4–4.9)
RBC # BLD: 4.68 M/UL (ref 4.2–5.4)
SODIUM BLD-SCNC: 142 MEQ/L (ref 135–144)
T4 FREE: 1.17 NG/DL (ref 0.84–1.68)
TOTAL PROTEIN: 7.6 G/DL (ref 6.3–8)
TRIGL SERPL-MCNC: 118 MG/DL (ref 0–150)
TSH REFLEX: 4.4 UIU/ML (ref 0.44–3.86)
VITAMIN D 25-HYDROXY: 46.9 NG/ML (ref 30–100)
WBC # BLD: 5 K/UL (ref 4.8–10.8)

## 2019-07-22 DIAGNOSIS — Z13.220 NEED FOR LIPID SCREENING: Primary | ICD-10-CM

## 2019-07-22 DIAGNOSIS — Z13.29 THYROID DISORDER SCREEN: ICD-10-CM

## 2019-07-22 DIAGNOSIS — Z13.228 SCREENING FOR METABOLIC DISORDER: ICD-10-CM

## 2019-07-24 ENCOUNTER — HOSPITAL ENCOUNTER (OUTPATIENT)
Dept: WOMENS IMAGING | Age: 64
Discharge: HOME | End: 2019-07-26
Payer: MEDICARE

## 2019-07-24 DIAGNOSIS — Z12.39 SCREENING FOR BREAST CANCER: ICD-10-CM

## 2019-07-24 PROCEDURE — 77067 SCR MAMMO BI INCL CAD: CPT

## 2019-09-10 RX ORDER — LIDOCAINE HYDROCHLORIDE 10 MG/ML
1 INJECTION, SOLUTION EPIDURAL; INFILTRATION; INTRACAUDAL; PERINEURAL
Status: CANCELLED | OUTPATIENT
Start: 2019-09-10 | End: 2019-09-10

## 2019-09-16 ENCOUNTER — ANESTHESIA EVENT (OUTPATIENT)
Dept: ENDOSCOPY | Age: 64
End: 2019-09-16
Payer: MEDICARE

## 2019-09-16 ENCOUNTER — HOSPITAL ENCOUNTER (OUTPATIENT)
Age: 64
Setting detail: OUTPATIENT SURGERY
Discharge: HOME | End: 2019-09-16
Attending: INTERNAL MEDICINE | Admitting: INTERNAL MEDICINE
Payer: MEDICARE

## 2019-09-16 ENCOUNTER — ANESTHESIA (OUTPATIENT)
Dept: ENDOSCOPY | Age: 64
End: 2019-09-16
Payer: MEDICARE

## 2019-09-16 VITALS
SYSTOLIC BLOOD PRESSURE: 114 MMHG | RESPIRATION RATE: 8 BRPM | OXYGEN SATURATION: 97 % | DIASTOLIC BLOOD PRESSURE: 69 MMHG

## 2019-09-16 VITALS
RESPIRATION RATE: 16 BRPM | SYSTOLIC BLOOD PRESSURE: 118 MMHG | HEIGHT: 70 IN | TEMPERATURE: 98.7 F | OXYGEN SATURATION: 98 % | HEART RATE: 72 BPM | DIASTOLIC BLOOD PRESSURE: 78 MMHG | BODY MASS INDEX: 23.62 KG/M2 | WEIGHT: 165 LBS

## 2019-09-16 PROCEDURE — 2580000003 HC RX 258: Performed by: NURSE ANESTHETIST, CERTIFIED REGISTERED

## 2019-09-16 PROCEDURE — G0121 COLON CA SCRN NOT HI RSK IND: HCPCS | Performed by: INTERNAL MEDICINE

## 2019-09-16 PROCEDURE — 3609027000 HC COLONOSCOPY: Performed by: INTERNAL MEDICINE

## 2019-09-16 PROCEDURE — 7100000010 HC PHASE II RECOVERY - FIRST 15 MIN: Performed by: INTERNAL MEDICINE

## 2019-09-16 PROCEDURE — 3700000001 HC ADD 15 MINUTES (ANESTHESIA): Performed by: INTERNAL MEDICINE

## 2019-09-16 PROCEDURE — 3700000000 HC ANESTHESIA ATTENDED CARE: Performed by: INTERNAL MEDICINE

## 2019-09-16 PROCEDURE — 2580000003 HC RX 258: Performed by: INTERNAL MEDICINE

## 2019-09-16 PROCEDURE — 7100000011 HC PHASE II RECOVERY - ADDTL 15 MIN: Performed by: INTERNAL MEDICINE

## 2019-09-16 PROCEDURE — 6360000002 HC RX W HCPCS: Performed by: NURSE ANESTHETIST, CERTIFIED REGISTERED

## 2019-09-16 RX ORDER — CHOLECALCIFEROL (VITAMIN D3) 125 MCG
5000 CAPSULE ORAL DAILY
COMMUNITY

## 2019-09-16 RX ORDER — PROPOFOL 10 MG/ML
INJECTION, EMULSION INTRAVENOUS CONTINUOUS PRN
Status: DISCONTINUED | OUTPATIENT
Start: 2019-09-16 | End: 2019-09-16 | Stop reason: SDUPTHER

## 2019-09-16 RX ORDER — SODIUM CHLORIDE 0.9 % (FLUSH) 0.9 %
10 SYRINGE (ML) INJECTION EVERY 12 HOURS SCHEDULED
Status: DISCONTINUED | OUTPATIENT
Start: 2019-09-16 | End: 2019-09-16 | Stop reason: HOSPADM

## 2019-09-16 RX ORDER — ACETAMINOPHEN 160 MG
5000 TABLET,DISINTEGRATING ORAL DAILY
COMMUNITY

## 2019-09-16 RX ORDER — ONDANSETRON 2 MG/ML
4 INJECTION INTRAMUSCULAR; INTRAVENOUS
Status: DISCONTINUED | OUTPATIENT
Start: 2019-09-16 | End: 2019-09-16 | Stop reason: HOSPADM

## 2019-09-16 RX ORDER — LANOLIN ALCOHOL/MO/W.PET/CERES
800 CREAM (GRAM) TOPICAL DAILY
COMMUNITY

## 2019-09-16 RX ORDER — UBIDECARENONE 50 MG
1200 CAPSULE ORAL ONCE
COMMUNITY

## 2019-09-16 RX ORDER — VITAMIN B COMPLEX
1 CAPSULE ORAL 2 TIMES DAILY
COMMUNITY

## 2019-09-16 RX ORDER — SODIUM CHLORIDE 9 MG/ML
INJECTION, SOLUTION INTRAVENOUS CONTINUOUS
Status: DISCONTINUED | OUTPATIENT
Start: 2019-09-16 | End: 2019-09-16 | Stop reason: HOSPADM

## 2019-09-16 RX ORDER — SOY ISOFLAVONE 40 MG
2000 TABLET ORAL ONCE
COMMUNITY

## 2019-09-16 RX ORDER — SODIUM CHLORIDE 0.9 % (FLUSH) 0.9 %
10 SYRINGE (ML) INJECTION PRN
Status: DISCONTINUED | OUTPATIENT
Start: 2019-09-16 | End: 2019-09-16 | Stop reason: HOSPADM

## 2019-09-16 RX ORDER — SODIUM CHLORIDE 9 MG/ML
INJECTION, SOLUTION INTRAVENOUS CONTINUOUS PRN
Status: DISCONTINUED | OUTPATIENT
Start: 2019-09-16 | End: 2019-09-16 | Stop reason: SDUPTHER

## 2019-09-16 RX ADMIN — SODIUM CHLORIDE: 9 INJECTION, SOLUTION INTRAVENOUS at 08:58

## 2019-09-16 RX ADMIN — SODIUM CHLORIDE: 9 INJECTION, SOLUTION INTRAVENOUS at 09:15

## 2019-09-16 RX ADMIN — PROPOFOL 100 MCG/KG/MIN: 10 INJECTION, EMULSION INTRAVENOUS at 09:32

## 2019-09-16 ASSESSMENT — PAIN SCALES - GENERAL: PAINLEVEL_OUTOF10: 0

## 2019-09-16 NOTE — ANESTHESIA POSTPROCEDURE EVALUATION
Department of Anesthesiology  Postprocedure Note    Patient: Dagoberto Serna  MRN: [id number]  [place]: 1955  Date of evaluation: 9/16/2019  Time:  9:49 AM     Procedure Summary     Date:  09/16/19 Room / Location:  [facility] OR 01 / [place]    Anesthesia Start:  2307 Anesthesia Stop:      Procedure:  COLORECTAL CANCER SCREENING, NOT HIGH RISK (N/A ) Diagnosis:  ( - Screening)    Surgeon:  Tania Garcia MD Responsible Provider:  ELIZABETH Devine CRNA    Anesthesia Type:  MAC ASA Status:  1          Anesthesia Type: MAC    Uziel Phase I:      Uziel Phase II:      Last vitals: Reviewed and per EMR flowsheets.        Anesthesia Post Evaluation    Patient location during evaluation: PACU  Patient participation: complete - patient participated  Level of consciousness: awake and alert  Pain score: 0  Airway patency: patent  Nausea & Vomiting: no nausea and no vomiting  Complications: no  Cardiovascular status: blood pressure returned to baseline and hemodynamically stable  Respiratory status: acceptable  Hydration status: euvolemic

## 2019-09-16 NOTE — ANESTHESIA PRE PROCEDURE
HGB 14.9 07/18/2019    HCT 42.7 07/18/2019    MCV 91.3 07/18/2019    RDW 13.6 07/18/2019     07/18/2019       CMP:   Lab Results   Component Value Date     07/18/2019    K 4.9 07/18/2019     07/18/2019    CO2 27 07/18/2019    BUN 13 07/18/2019    CREATININE 0.82 07/18/2019    GFRAA >60.0 07/18/2019    LABGLOM >60.0 07/18/2019    GLUCOSE 98 07/18/2019    PROT 7.6 07/18/2019    CALCIUM 10.1 07/18/2019    BILITOT 0.4 07/18/2019    ALKPHOS 49 07/18/2019    AST 25 07/18/2019    ALT 25 07/18/2019       POC Tests: No results for input(s): POCGLU, POCNA, POCK, POCCL, POCBUN, POCHEMO, POCHCT in the last 72 hours. Coags: No results found for: PROTIME, INR, APTT    HCG (If Applicable): No results found for: PREGTESTUR, PREGSERUM, HCG, HCGQUANT     ABGs: No results found for: PHART, PO2ART, ETQ6FCK, ASO2CAK, BEART, E0CDWTCK     Type & Screen (If Applicable):  No results found for: LABABO, [address]    Anesthesia Evaluation  Patient summary reviewed and Nursing notes reviewed  Airway: Mallampati: II  TM distance: >3 FB   Neck ROM: full  Mouth opening: > = 3 FB Dental: normal exam         Pulmonary:Negative Pulmonary ROS and normal exam                               Cardiovascular:Negative CV ROS                      Neuro/Psych:   Negative Neuro/Psych ROS              GI/Hepatic/Renal: Neg GI/Hepatic/Renal ROS            Endo/Other: Negative Endo/Other ROS                    Abdominal:           Vascular:                                        Anesthesia Plan      MAC     ASA 1             Anesthetic plan and risks discussed with patient. Plan discussed with CRNA.                   ELIZABETH Baig - CRNA   9/16/2019

## 2020-03-31 RX ORDER — ESTRADIOL 0.1 MG/D
FILM, EXTENDED RELEASE TRANSDERMAL
Qty: 24 PATCH | Refills: 1 | Status: SHIPPED | OUTPATIENT
Start: 2020-03-31 | End: 2020-04-06 | Stop reason: SDUPTHER

## 2020-04-01 ENCOUNTER — TELEPHONE (OUTPATIENT)
Dept: FAMILY MEDICINE CLINIC | Age: 65
End: 2020-04-01

## 2020-04-01 NOTE — TELEPHONE ENCOUNTER
Vasomotor symptoms associated with menopause: Sunil Klein: Initial: Apply 0.0375 mg/day patch twice weekly and can adjust dose as necessary. I see from 7/17/19 note \"She does cut the patch in half. \"  Appears she had a 0.1 mg patch. . so getting 0.05 mg at that time? Can't be clear and not sure how she has most recently been using it. In general, I would say to start low and titrate as needed. [employer] does not recommend cutting patches as no studies have been done showing stability. Artemio Rodriguez comes in the following strengths: Vivelle-Dot: 0.025 mg/24 hr; 0.0375 mg/24 hr; 0.05 mg/24 hr; 0.075 mg/24 hr; 0.1 mg/24 hr. Thank you!   Luis Mills, PharmD, [address] Pharmacist  O: [phone]  Department, toll free: [phone], option 7

## 2020-04-06 ENCOUNTER — TELEPHONE (OUTPATIENT)
Dept: FAMILY MEDICINE CLINIC | Age: 65
End: 2020-04-06

## 2020-04-06 ENCOUNTER — VIRTUAL VISIT (OUTPATIENT)
Dept: FAMILY MEDICINE CLINIC | Age: 65
End: 2020-04-06
Payer: MEDICARE

## 2020-04-06 PROCEDURE — 99214 OFFICE O/P EST MOD 30 MIN: CPT | Performed by: INTERNAL MEDICINE

## 2020-04-06 RX ORDER — ZOSTER VACCINE RECOMBINANT, ADJUVANTED 50 MCG/0.5
0.5 KIT INTRAMUSCULAR SEE ADMIN INSTRUCTIONS
Qty: 0.5 ML | Refills: 0 | Status: SHIPPED | OUTPATIENT
Start: 2020-04-06 | End: 2020-10-03

## 2020-04-06 RX ORDER — VENLAFAXINE HYDROCHLORIDE 37.5 MG/1
37.5 CAPSULE, EXTENDED RELEASE ORAL DAILY
Qty: 30 CAPSULE | Refills: 0 | Status: SHIPPED | OUTPATIENT
Start: 2020-04-06 | End: 2020-11-02

## 2020-04-06 RX ORDER — ESTRADIOL 0.1 MG/D
FILM, EXTENDED RELEASE TRANSDERMAL
Qty: 24 PATCH | Refills: 0 | Status: SHIPPED | OUTPATIENT
Start: 2020-04-06 | End: 2020-09-08 | Stop reason: SDUPTHER

## 2020-04-06 RX ORDER — OMEGA-3-ACID ETHYL ESTERS 1 G/1
2 CAPSULE, LIQUID FILLED ORAL 2 TIMES DAILY
Qty: 360 CAPSULE | Refills: 3 | Status: SHIPPED | OUTPATIENT
Start: 2020-04-06 | End: 2021-06-20

## 2020-04-06 ASSESSMENT — ENCOUNTER SYMPTOMS
ABDOMINAL PAIN: 0
BACK PAIN: 0
EYE PAIN: 0
SHORTNESS OF BREATH: 0

## 2020-04-06 NOTE — PROGRESS NOTES
Subjective:      Patient ID: Luis A Marrufo is a 71 y.o. female who presents today with:  No chief complaint on file. HPI      Hysterectomy, has been on estrogen patch for 25 years. Denies side effects. Compliant. Chronic,Hypertrig. Compliant with lovaza, but was stopped in ayanna of vascepa, but that was too expensive. History of Shingles, had zostavax. Never had Shingrix. Taya Eisenberg No past medical history on file.   Past Surgical History:   Procedure Laterality Date    APPENDECTOMY  1959    COLONOSCOPY N/A 9/16/2019    COLORECTAL CANCER SCREENING, NOT HIGH RISK performed by Johanna Landers MD at [address] History     Socioeconomic History    Marital status:      Spouse name: Not on file    Number of children: Not on file    Years of education: Not on file    Highest education level: Not on file   Occupational History    Not on file   Social Needs    Financial resource strain: Not on file    Food insecurity     Worry: Not on file     Inability: Not on file    Transportation needs     Medical: Not on file     Non-medical: Not on file   Tobacco Use    Smoking status: Never Smoker    Smokeless tobacco: Never Used   Substance and Sexual Activity    Alcohol use: Not on file     Comment: socially     Drug use: Never    Sexual activity: Yes     Comment:     Lifestyle    Physical activity     Days per week: Not on file     Minutes per session: Not on file    Stress: Not on file   Relationships    Social connections     Talks on phone: Not on file     Gets together: Not on file     Attends [employer] service: Not on file     Active member of club or organization: Not on file     Attends meetings of clubs or organizations: Not on file     Relationship status: Not on file    Intimate partner violence     Fear of current or ex partner: Not on file     Emotionally abused: Not on file     Physically abused: Not on file     Forced sexual activity: Not on file   Other Topics Concern    Not on file   Social History Narrative    Not on file     No Known Allergies  Current Outpatient Medications on File Prior to Visit   Medication Sig Dispense Refill    estradiol (VIVELLE) 0.1 MG/24HR PLACE 1 PATCH ON THE SKIN TWICE A WEEK 24 patch 1    Magnesium Malate 1250 (141.7 Mg) MG TABS Take 2,500 mg by mouth once       Methylsulfonylmethane (MSM) 1000 MG CAPS Take 2,000 mg by mouth once       b complex vitamins capsule Take 1 capsule by mouth 2 times daily      vitamin B-12 (CYANOCOBALAMIN) 500 MCG tablet Take 5,000 mcg by mouth daily      Red Yeast Rice 600 MG TABS Take 1,200 mg by mouth once       Cholecalciferol (VITAMIN D3) 2000 units CAPS Take 2,000 Units by mouth daily      Probiotic Product (PROBIOTIC ADVANCED PO) Take 540 mg by mouth daily      folic acid (FOLVITE) 286 MCG tablet Take 800 mcg by mouth daily      TURMERIC PO Take 900 mg by mouth      FIBER ADULT GUMMIES PO Take 6 g by mouth once       No current facility-administered medications on file prior to visit. I have personally reviewed the ROS, PMH, PFH, and social history     Review of Systems   Constitutional: Negative for chills and fever. HENT: Negative for congestion. Eyes: Negative for pain. Respiratory: Negative for shortness of breath. Cardiovascular: Negative for chest pain. Gastrointestinal: Negative for abdominal pain. Genitourinary: Negative for hematuria. Musculoskeletal: Negative for back pain. Allergic/Immunologic: Negative for immunocompromised state. Neurological: Negative for headaches. Psychiatric/Behavioral: Negative for hallucinations. Objective: There were no vitals taken for this visit. Physical Exam  Constitutional:       General: She is not in acute distress. Appearance: She is not ill-appearing, toxic-appearing or diaphoretic. Assessment:       Diagnosis Orders   1.  Post-menopausal  TSH with Reflex    CBC Auto

## 2020-04-06 NOTE — PATIENT INSTRUCTIONS
pictures. · Mammograms do not prevent breast cancer or reduce a woman's risk of developing cancer. · Most things that are found during a mammogram are not breast cancer. How long does the test take? · The test will take about 10 to 15 minutes. You may be in the clinic for up to an hour. What happens after the test?  · You will probably be able to go home right away. · You can go back to your usual activities right away. Follow-up care is a key part of your treatment and safety. Be sure to make and go to all appointments, and call your doctor if you are having problems. It's also a good idea to keep a list of the medicines you take. Ask your doctor when you can expect to have your test results. Where can you learn more? Go to [email or web address]://[email or web address].[email or web address]. org and sign in to your All[employer] account. Enter U310 in the [facility] box to learn more about \"Mammogram: About This Test.\"     If you do not have an account, please click on the \"Sign Up Now\" link. Current as of: August 21, 2019Content Version: 12.4  © 0486-8733 Healthwise, Incorporated. Care instructions adapted under license by [facility] ([facility]). If you have questions about a medical condition or this instruction, always ask your healthcare professional. [address] any warranty or liability for your use of this information.

## 2020-04-06 NOTE — TELEPHONE ENCOUNTER
Please prepare CAC test with diagnosis of hyperlipidemia and have faxed to [facility]. Reach out to him when this is completed and he will need to call to schedule.

## 2020-04-07 ENCOUNTER — TELEPHONE (OUTPATIENT)
Dept: FAMILY MEDICINE CLINIC | Age: 65
End: 2020-04-07

## 2020-04-13 ENCOUNTER — TELEPHONE (OUTPATIENT)
Dept: FAMILY MEDICINE CLINIC | Age: 65
End: 2020-04-13

## 2020-04-16 ENCOUNTER — TELEPHONE (OUTPATIENT)
Dept: FAMILY MEDICINE CLINIC | Age: 65
End: 2020-04-16

## 2020-04-16 NOTE — TELEPHONE ENCOUNTER
Patient called to check on her Lovaza. Stated that when she called [employer] a few days ago to check on it, they indicated to her that all she needed to have done was for our office to call and let them know it's approved. Patient stated that she looked on line last night and it shows that it's still \"on hold because of some authorization\". Can you follow up on the pre auth with [employer]?

## 2020-06-02 ENCOUNTER — VIRTUAL VISIT (OUTPATIENT)
Dept: FAMILY MEDICINE CLINIC | Age: 65
End: 2020-06-02
Payer: MEDICARE

## 2020-06-02 PROCEDURE — G0438 PPPS, INITIAL VISIT: HCPCS | Performed by: NURSE PRACTITIONER

## 2020-06-02 ASSESSMENT — PATIENT HEALTH QUESTIONNAIRE - PHQ9
SUM OF ALL RESPONSES TO PHQ QUESTIONS 1-9: 0
SUM OF ALL RESPONSES TO PHQ QUESTIONS 1-9: 0

## 2020-06-02 ASSESSMENT — LIFESTYLE VARIABLES: HOW OFTEN DO YOU HAVE A DRINK CONTAINING ALCOHOL: 0

## 2020-06-02 NOTE — PROGRESS NOTES
patient declined    Personalized Preventive Plan   Current Health Maintenance Status  Immunization History   Administered Date(s) Administered    Zoster Live (Zostavax) 07/25/2016        Health Maintenance   Topic Date Due    DTaP/Tdap/Td vaccine (1 - Tdap) 07/15/1973    Shingles Vaccine (2 of 3) 09/19/2016    Pneumococcal 65+ years Vaccine (1 of 1 - PPSV23) 07/15/2019    Annual Wellness Visit (AWV)  07/17/2019    DEXA (modify frequency per FRAX score)  04/06/2030 (Originally 7/15/2009)    Hepatitis C screen  04/06/2030 (Originally 1955)    HIV screen  04/06/2030 (Originally 7/15/1969)    Flu vaccine (Season Ended) 09/01/2020    Breast cancer screen  07/24/2021    Lipid screen  07/18/2024    Colon cancer screen colonoscopy  09/16/2029    Hepatitis A vaccine  Aged Out    Hepatitis B vaccine  Aged Out    Hib vaccine  Aged Out    Meningococcal (ACWY) vaccine  Aged Out     Recommendations for [employer] Due: see orders and patient instructions/AVS.  . Recommended screening schedule for the next 5-10 years is provided to the patient in written form: see Patient Instructions/AVS.    Ulises Dawson was seen today for medicare awv. Diagnoses and all orders for this visit:    Routine general medical examination at a health care facility              Antonio Miranda is a 71 y.o. female being evaluated by a Virtual Visit (phone) encounter to address concerns as mentioned above. A caregiver was present when appropriate. Due to this being a TeleHealth encounter (During XVILP-40 public health emergency), evaluation of the following organ systems was limited: Vitals/Constitutional/EENT/Resp/CV/GI//MS/Neuro/Skin/Heme-Lymph-Imm.   Pursuant to the emergency declaration under the [facility], [address] waiver authority and the [employer] Act, this Virtual Visit was conducted with patient's (and/or legal guardian's) consent,

## 2020-06-02 NOTE — PATIENT INSTRUCTIONS
Advance Directives: Care Instructions  Overview  An advance directive is a legal way to state your wishes at the end of your life. It tells your family and your doctor what to do if you can't say what you want. There are two main types of advance directives. You can change them any time your wishes change. Living will. This form tells your family and your doctor your wishes about life support and other treatment. The form is also called a declaration. Medical power of . This form lets you name a person to make treatment decisions for you when you can't speak for yourself. This person is called a health care agent (health care proxy, health care surrogate). The form is also called a durable power of  for health care. If you do not have an advance directive, decisions about your medical care may be made by a family member, or by a doctor or a  who doesn't know you. It may help to think of an advance directive as a gift to the people who care for you. If you have one, they won't have to make tough decisions by themselves. Follow-up care is a key part of your treatment and safety. Be sure to make and go to all appointments, and call your doctor if you are having problems. It's also a good idea to know your test results and keep a list of the medicines you take. What should you include in an advance directive? Many states have a unique advance directive form. (It may ask you to address specific issues.) Or you might use a universal form that's approved by many states. If your form doesn't tell you what to address, it may be hard to know what to include in your advance directive. Use the questions below to help you get started. · Who do you want to make decisions about your medical care if you are not able to? · What life-support measures do you want if you have a serious illness that gets worse over time or can't be cured? · What are you most afraid of that might happen? (Maybe you're afraid of having pain, losing your independence, or being kept alive by machines.)  · Where would you prefer to die? (Your home? A hospital? A nursing home?)  · Do you want to donate your organs when you die? · Do you want certain Congregation practices performed before you die? When should you call for help? Be sure to contact your doctor if you have any questions. Where can you learn more? Go to https://[email or web address].[email or web address] org and sign in to your [employer] account. Enter R264 in the [employer] box to learn more about \"Advance Directives: Care Instructions. \"     If you do not have an account, please click on the \"Sign Up Now\" link. Current as of: December 9, 2019               Content Version: 12.5  © 4089-1939 Healthwise, Incorporated. Care instructions adapted under license by [facility] ([facility]). If you have questions about a medical condition or this instruction, always ask your healthcare professional. [place] [address] any warranty or liability for your use of this information. Learning About Medical Power of   What is a medical power of ? A medical power of , also called a durable power of  for health care, is one type of the legal forms called advance directives. It lets you name the person you want to make treatment decisions for you if you can't speak or decide for yourself. The person you choose is called your health care agent. This person is also called a health care proxy or health care surrogate. A medical power of  may be called something else in your state. How do you choose a health care agent? Choose your health care agent carefully. This person may or may not be a family member. Talk to the person before you make your final decision. Make sure he or she is comfortable with this responsibility. It's a good idea to choose someone who:  · Is at least 25years old.   · Knows you well and living will. · Some states may limit your right to refuse treatment in certain cases. For example, you may need to clearly state in your living will that you don't want artificial hydration and nutrition, such as being fed through a tube. Is a living will a legal document? A living will is a legal document. Each state has its own laws about living kaye. And a living will may be called something else in your state. Here are some things to know about living kaye. · You don't need an  to complete a living will. But legal advice can be helpful if your state's laws are unclear. It can also help if your health history is complicated or your family can't agree on what should be in your living will. · You can change your living will at any time. Some people find that their wishes about end-of-life care change as their health changes. If you make big changes to your living will, complete a new form. · If you move to another state, make sure that your living will is legal in the state where you now live. In most cases, doctors will respect your wishes even if you have a form from a different state. · You might use a universal form that has been approved by many states. This kind of form can sometimes be filled out and stored online. Your digital copy will then be available wherever you have a connection to the internet. The doctors and nurses who need to treat you can find it right away. · Your state may offer an online registry. This is another place where you can store your living will online. · It's a good idea to get your living will notarized. This means using a person called a  to watch two people sign, or witness, your living will. What should you know when you create a living will? Here are some questions to ask yourself as you make your living will:  · Do you know enough about life support methods that might be used?  If not, talk to your doctor so you know what might be done if you can't breathe on your own, your heart stops, or you can't swallow. · What things would you still want to be able to do after you receive life-support methods? Would you want to be able to walk? To speak? To eat on your own? To live without the help of machines? · Do you want certain Scientologist practices performed if you become very ill? · If you have a choice, where do you want to be cared for? In your home? At a hospital or nursing home? · If you have a choice at the end of your life, where would you prefer to die? At home? In a hospital or nursing home? Somewhere else? · Would you prefer to be buried or cremated? · Do you want your organs to be donated after you die? What should you do with your living will? · Make sure that your family members and your health care agent have copies of your living will (also called a declaration). · Give your doctor a copy of your living will. Ask him or her to keep it as part of your medical record. If you have more than one doctor, make sure that each one has a copy. · Put a copy of your living will where it can be easily found. For example, some people may put a copy on their refrigerator door. If you are using a digital copy, be sure your doctor, family members, and health care agent know how to find and access it. Where can you learn more? Go to [email or web address].[email or web address]. org and sign in to your [employer] account. Enter C603 in the [facility] box to learn more about \"Learning About Living Priyanka. \"     If you do not have an account, please click on the \"Sign Up Now\" link. Current as of: December 9, 2019               Content Version: 12.5  © 9893-2766 Healthwise, Incorporated. Care instructions adapted under license by [facility] ([facility]).  If you have questions about a medical condition or this instruction, always ask your healthcare professional. [address] any warranty or liability for your use of this

## 2020-07-27 ENCOUNTER — HOSPITAL ENCOUNTER (OUTPATIENT)
Dept: WOMENS IMAGING | Age: 65
Discharge: HOME | End: 2020-07-29
Payer: MEDICARE

## 2020-07-27 PROCEDURE — 77063 BREAST TOMOSYNTHESIS BI: CPT

## 2020-09-08 RX ORDER — ESTRADIOL 0.1 MG/D
FILM, EXTENDED RELEASE TRANSDERMAL
Qty: 24 PATCH | Refills: 0 | Status: SHIPPED | OUTPATIENT
Start: 2020-09-08 | End: 2021-03-17 | Stop reason: SDUPTHER

## 2020-09-23 ENCOUNTER — VIRTUAL VISIT (OUTPATIENT)
Dept: FAMILY MEDICINE CLINIC | Age: 65
End: 2020-09-23
Payer: MEDICARE

## 2020-09-23 ENCOUNTER — NURSE ONLY (OUTPATIENT)
Dept: FAMILY MEDICINE CLINIC | Age: 65
End: 2020-09-23

## 2020-09-23 ENCOUNTER — NURSE TRIAGE (OUTPATIENT)
Dept: OTHER | Facility: CLINIC | Age: 65
End: 2020-09-23

## 2020-09-23 PROCEDURE — 99442 PR PHYS/QHP TELEPHONE EVALUATION 11-20 MIN: CPT | Performed by: PHYSICIAN ASSISTANT

## 2020-09-23 ASSESSMENT — ENCOUNTER SYMPTOMS
GASTROINTESTINAL NEGATIVE: 1
RESPIRATORY NEGATIVE: 1

## 2020-09-23 NOTE — PROGRESS NOTES
2020     Darrel Estrada (:  1955) is a 76 y.o. female, here for evaluation of the following medical concerns:  Lost of taste and smell  HPI  Medicine telephone visit due to concern for exposure to Covid 19 (coronavirus). Patient is aware this is a billable visit. Patient with complaint of loss of taste and smell over the past 2 days she denies cough fever GI symptoms no known ill contacts she is concerned for covid due to her 's multiple medical conditions    Review of Systems   Constitutional: Negative. HENT: Negative. See hpi   Respiratory: Negative. Cardiovascular: Negative. Gastrointestinal: Negative. Allergic/Immunologic: Negative for environmental allergies. Prior to Visit Medications    Medication Sig Taking?  Authorizing Provider   estradiol (VIVELLE) 0.1 MG/24HR PLACE 1 PATCH ON THE SKIN TWICE A WEEK (PLEASE CONTACT DOCTOR)  Sveta Land MD   omega-3 acid ethyl esters (LOVAZA) 1 g capsule Take 2 capsules by mouth 2 times daily  Sveta Land MD   zoster recombinant adjuvanted vaccine [facility]) 50 MCG/0.5ML SUSR injection Inject 0.5 mLs into the muscle See Admin Instructions 1 dose now and repeat in 2-6 months  Sveta Land MD   venlafaxine (EFFEXOR XR) 37.5 MG extended release capsule Take 1 capsule by mouth daily  Svtea Land MD   Magnesium Malate 1250 (141.7 Mg) MG TABS Take 2,500 mg by mouth once   Historical Provider, MD   Methylsulfonylmethane (MSM) 1000 MG CAPS Take 2,000 mg by mouth once   Historical Provider, MD   b complex vitamins capsule Take 1 capsule by mouth 2 times daily  Historical Provider, MD   vitamin B-12 (CYANOCOBALAMIN) 500 MCG tablet Take 5,000 mcg by mouth daily  Historical Provider, MD   Red Yeast Rice 600 MG TABS Take 1,200 mg by mouth once   Historical Provider, MD   Cholecalciferol (VITAMIN D3) 2000 units CAPS Take 2,000 Units by mouth daily  Historical Provider, MD   Probiotic Product (PROBIOTIC ADVANCED PO) Take 540 mg by mouth daily  Historical Provider, MD   folic acid (FOLVITE) 047 MCG tablet Take 800 mcg by mouth daily  Historical Provider, MD   TURMERIC PO Take 900 mg by mouth  Historical Provider, MD   FIBER ADULT GUMMIES PO Take 6 g by mouth once  Historical Provider, MD        Social History     Tobacco Use    Smoking status: Never Smoker    Smokeless tobacco: Never Used   Substance Use Topics    Alcohol use: Not on file     Comment: socially         There were no vitals filed for this visit. Estimated body mass index is 24.02 kg/m² as calculated from the following:    Height as of 9/16/19: 5' 9.5\" (1.765 m). Weight as of 9/16/19: 165 lb (74.8 kg). Physical Exam  Pulmonary:      Effort: Pulmonary effort is normal. No respiratory distress. Breath sounds: No wheezing. Neurological:      Mental Status: She is oriented to person, place, and time. Psychiatric:         Mood and Affect: Mood normal.         Behavior: Behavior normal.         Thought Content: Thought content normal.         Judgment: Judgment normal.              Assessment & Plan    Diagnosis Orders   1. COVID-19 ruled out         11-20 min visit  An electronic signature was used to authenticate this note.     --Miladis Green PA-C on 9/23/2020 at 2:01 PM

## 2020-09-23 NOTE — TELEPHONE ENCOUNTER
Patient called [facility]) to schedule appointment with red flag complaint; transferred to Nurse Access for triage by Marguerite Correia (agent's name). Reason for Disposition   HIGH RISK patient (e.g., age > 59 years, diabetes, heart or lung disease, weak immune system)    Answer Assessment - Initial Assessment Questions  1. COVID-19 DIAGNOSIS: \"Who made your Coronavirus (COVID-19) diagnosis? \" \"Was it confirmed by a positive lab test?\" If not diagnosed by a HCP, ask \"Are there lots of cases (community spread) where you live? \" (See public health department website, if unsure)      N/A  2. ONSET: \"When did the COVID-19 symptoms start? \"       yesterday  3. WORST SYMPTOM: \"What is your worst symptom? \" (e.g., cough, fever, shortness of breath, muscle aches)      Loss of smell and taste  4. COUGH: \"Do you have a cough? \" If so, ask: \"How bad is the cough? \"        No  5. FEVER: \"Do you have a fever? \" If so, ask: \"What is your temperature, how was it measured, and when did it start? \"      No  6. RESPIRATORY STATUS: \"Describe your breathing? \" (e.g., shortness of breath, wheezing, unable to speak)       fine  7. BETTER-SAME-WORSE: Royal Koppel you getting better, staying the same or getting worse compared to yesterday? \"  If getting worse, ask, \"In what way? \"      same  8. HIGH RISK DISEASE: \"Do you have any chronic medical problems? \" (e.g., asthma, heart or lung disease, weak immune system, etc.)      No  9. PREGNANCY: \"Is there any chance you are pregnant? \" \"When was your last menstrual period? \"      No  10. OTHER SYMPTOMS: \"Do you have any other symptoms? \"  (e.g., chills, fatigue, headache, loss of smell or taste, muscle pain, sore throat)        No    Protocols used: CORONAVIRUS (COVID-19) DIAGNOSED OR SUSPECTED-ADULT-    Informed of disposition. Care advice as documented. Instructed to call back with worsening symptoms. Soft transfer to [facility] Markos Pang) to schedule appointment as recommended.     Please do not respond to the triage nurse through this encounter. Any subsequent communication should be directly with the patient.

## 2020-09-25 LAB
SARS-COV-2: NOT DETECTED
SOURCE: NORMAL

## 2020-11-02 ENCOUNTER — OFFICE VISIT (OUTPATIENT)
Dept: FAMILY MEDICINE CLINIC | Age: 65
End: 2020-11-02
Payer: MEDICARE

## 2020-11-02 VITALS
BODY MASS INDEX: 25.18 KG/M2 | HEIGHT: 69 IN | OXYGEN SATURATION: 98 % | DIASTOLIC BLOOD PRESSURE: 83 MMHG | TEMPERATURE: 98 F | SYSTOLIC BLOOD PRESSURE: 124 MMHG | HEART RATE: 81 BPM | RESPIRATION RATE: 15 BRPM | WEIGHT: 170 LBS

## 2020-11-02 PROCEDURE — 99214 OFFICE O/P EST MOD 30 MIN: CPT | Performed by: INTERNAL MEDICINE

## 2020-11-02 ASSESSMENT — ENCOUNTER SYMPTOMS
EYE PAIN: 0
SHORTNESS OF BREATH: 0
BACK PAIN: 0
ABDOMINAL PAIN: 0

## 2020-11-02 NOTE — PROGRESS NOTES
Subjective:      Patient ID: Madelin Ku is a 76 y.o. female who presents today with:  Chief Complaint   Patient presents with    6 Month Follow-Up    Immunizations     declined flu shot        HPI    Post menopausal hot flashes  On td estrogen  Reluctant to come off of it. Elevated diastolic readings. Elevated ascvd risk  Not on statin  Very reluctant. Chronic  bmi of 24  exercises  No past medical history on file.   Past Surgical History:   Procedure Laterality Date    APPENDECTOMY  1959    COLONOSCOPY N/A 9/16/2019    COLORECTAL CANCER SCREENING, NOT HIGH RISK performed by Brii Valenzuela MD at [address] History     Socioeconomic History    Marital status:      Spouse name: Not on file    Number of children: Not on file    Years of education: Not on file    Highest education level: Not on file   Occupational History    Not on file   Social Needs    Financial resource strain: Not on file    Food insecurity     Worry: Not on file     Inability: Not on file    Transportation needs     Medical: Not on file     Non-medical: Not on file   Tobacco Use    Smoking status: Never Smoker    Smokeless tobacco: Never Used   Substance and Sexual Activity    Alcohol use: Not on file     Comment: socially     Drug use: Never    Sexual activity: Yes     Comment:     Lifestyle    Physical activity     Days per week: Not on file     Minutes per session: Not on file    Stress: Not on file   Relationships    Social connections     Talks on phone: Not on file     Gets together: Not on file     Attends Baptist service: Not on file     Active member of club or organization: Not on file     Attends meetings of clubs or organizations: Not on file     Relationship status: Not on file    Intimate partner violence     Fear of current or ex partner: Not on file     Emotionally abused: Not on file     Physically abused: Not on file     Forced sexual activity: Not on file   Other Topics Concern    Not on file   Social History Narrative    Not on file     No Known Allergies  Current Outpatient Medications on File Prior to Visit   Medication Sig Dispense Refill    estradiol (VIVELLE) 0.1 MG/24HR PLACE 1 PATCH ON THE SKIN TWICE A WEEK (PLEASE CONTACT DOCTOR) 24 patch 0    omega-3 acid ethyl esters (LOVAZA) 1 g capsule Take 2 capsules by mouth 2 times daily 360 capsule 3    Magnesium Malate 1250 (141.7 Mg) MG TABS Take 2,500 mg by mouth once       Methylsulfonylmethane (MSM) 1000 MG CAPS Take 2,000 mg by mouth once       b complex vitamins capsule Take 1 capsule by mouth 2 times daily      vitamin B-12 (CYANOCOBALAMIN) 500 MCG tablet Take 5,000 mcg by mouth daily      Red Yeast Rice 600 MG TABS Take 1,200 mg by mouth once       Cholecalciferol (VITAMIN D3) 2000 units CAPS Take 2,000 Units by mouth daily      Probiotic Product (PROBIOTIC ADVANCED PO) Take 540 mg by mouth daily      folic acid (FOLVITE) 924 MCG tablet Take 800 mcg by mouth daily      TURMERIC PO Take 900 mg by mouth      FIBER ADULT GUMMIES PO Take 6 g by mouth once       No current facility-administered medications on file prior to visit. I have personally reviewed the ROS, PMH, PFH, and social history     Review of Systems   Constitutional: Negative for chills and fever. HENT: Negative for congestion. Eyes: Negative for pain. Respiratory: Negative for shortness of breath. Cardiovascular: Negative for chest pain. Gastrointestinal: Negative for abdominal pain. Genitourinary: Negative for hematuria. Musculoskeletal: Negative for back pain. Allergic/Immunologic: Negative for immunocompromised state. Neurological: Negative for headaches. Psychiatric/Behavioral: Negative for hallucinations.        Objective:   /83 (Site: Left Upper Arm, Position: Sitting, Cuff Size: Large Adult)   Pulse 81   Temp 98 °F (36.7 °C) (Oral)   Resp 15   Ht 5' 9\" (1.753 m)   Wt 170 lb (77.1 kg)   SpO2 98%   BMI 25.10 kg/m²     Physical Exam  Constitutional:       Appearance: She is well-developed. HENT:      Head: Normocephalic. Eyes:      Pupils: Pupils are equal, round, and reactive to light. Neck:      Vascular: No carotid bruit. Trachea: No tracheal deviation. Cardiovascular:      Rate and Rhythm: Normal rate and regular rhythm. Heart sounds: Normal heart sounds. No murmur. No friction rub. No gallop. Pulmonary:      Effort: No respiratory distress. Breath sounds: No wheezing or rales. Abdominal:      General: Bowel sounds are normal. There is no distension. Palpations: Abdomen is soft. Tenderness: There is no abdominal tenderness. There is no right CVA tenderness, left CVA tenderness, guarding or rebound. Musculoskeletal:      Right lower leg: No edema. Left lower leg: No edema. Skin:     General: Skin is warm and dry. Neurological:      Mental Status: She is oriented to person, place, and time. Assessment:       Diagnosis Orders   1. Post-menopausal     2. Elevated blood pressure reading     3. Candidate for statin therapy due to risk of future cardiovascular event     4. Encounter for screening mammogram for malignant neoplasm of breast  ZOILA DIGITAL SCREEN W OR WO CAD BILATERAL         Plan:    VC  cac test. (unless she refuses)   Continue chronic medications  Might need low dose bp treatment is agreeable. Reluctant to trial off td estrogen and try ssri, snri, etc.           Orders Placed This Encounter   Procedures    ZOILA DIGITAL SCREEN W OR WO CAD BILATERAL     Standing Status:   Future     Standing Expiration Date:   1/6/2022     Order Specific Question:   Reason for exam:     Answer:   screening     No orders of the defined types were placed in this encounter. refused flu shot 2020    F/U WILL depend on labs. Refused dexa scan   Refused pneumonia shots. Shingrix to pharmacy.  (doesn't want it)   Risk of stroke, cancer, etc explained. (with estrogen)   BP THREE TIMES A WEEK AND if it's above 130/80 reach out to me. If anything should change or worsen call ASAP, don't wait for next scheduled appointment. Return in 6 months (on 5/2/2021) for Chronic condition management/appointment, worsening symptoms, call ASAP for appointment.       Nader Young MD

## 2020-11-03 DIAGNOSIS — Z78.0 POST-MENOPAUSAL: ICD-10-CM

## 2020-11-03 DIAGNOSIS — Z12.39 SCREENING FOR BREAST CANCER: ICD-10-CM

## 2020-11-03 DIAGNOSIS — R23.2 HOT FLASHES: ICD-10-CM

## 2020-11-03 DIAGNOSIS — E78.1 HYPERTRIGLYCERIDEMIA: ICD-10-CM

## 2020-11-03 DIAGNOSIS — E78.5 HYPERLIPIDEMIA, UNSPECIFIED HYPERLIPIDEMIA TYPE: ICD-10-CM

## 2020-11-03 DIAGNOSIS — Z12.31 ENCOUNTER FOR SCREENING MAMMOGRAM FOR MALIGNANT NEOPLASM OF BREAST: ICD-10-CM

## 2020-11-03 LAB
ALBUMIN SERPL-MCNC: 4.4 G/DL (ref 3.5–4.6)
ALP BLD-CCNC: 45 U/L (ref 40–130)
ALT SERPL-CCNC: 22 U/L (ref 0–33)
ANION GAP SERPL CALCULATED.3IONS-SCNC: 14 MEQ/L (ref 9–15)
AST SERPL-CCNC: 28 U/L (ref 0–35)
BASOPHILS ABSOLUTE: 0 K/UL (ref 0–0.2)
BASOPHILS RELATIVE PERCENT: 0.9 %
BILIRUB SERPL-MCNC: 0.4 MG/DL (ref 0.2–0.7)
BUN BLDV-MCNC: 17 MG/DL (ref 8–23)
CALCIUM SERPL-MCNC: 9.8 MG/DL (ref 8.5–9.9)
CHLORIDE BLD-SCNC: 103 MEQ/L (ref 95–107)
CHOLESTEROL, TOTAL: 225 MG/DL (ref 0–199)
CO2: 24 MEQ/L (ref 20–31)
CREAT SERPL-MCNC: 0.91 MG/DL (ref 0.5–0.9)
EOSINOPHILS ABSOLUTE: 0.2 K/UL (ref 0–0.7)
EOSINOPHILS RELATIVE PERCENT: 5.4 %
GFR AFRICAN AMERICAN: >60
GFR NON-AFRICAN AMERICAN: >60
GLOBULIN: 2.6 G/DL (ref 2.3–3.5)
GLUCOSE BLD-MCNC: 92 MG/DL (ref 70–99)
HCT VFR BLD CALC: 40.7 % (ref 37–47)
HDLC SERPL-MCNC: 78 MG/DL (ref 40–59)
HEMOGLOBIN: 13.6 G/DL (ref 12–16)
LDL CHOLESTEROL CALCULATED: 124 MG/DL (ref 0–129)
LYMPHOCYTES ABSOLUTE: 1.3 K/UL (ref 1–4.8)
LYMPHOCYTES RELATIVE PERCENT: 31.8 %
MCH RBC QN AUTO: 30.5 PG (ref 27–31.3)
MCHC RBC AUTO-ENTMCNC: 33.5 % (ref 33–37)
MCV RBC AUTO: 91 FL (ref 82–100)
MONOCYTES ABSOLUTE: 0.4 K/UL (ref 0.2–0.8)
MONOCYTES RELATIVE PERCENT: 8.7 %
NEUTROPHILS ABSOLUTE: 2.2 K/UL (ref 1.4–6.5)
NEUTROPHILS RELATIVE PERCENT: 53.2 %
PDW BLD-RTO: 13.6 % (ref 11.5–14.5)
PLATELET # BLD: 219 K/UL (ref 130–400)
POTASSIUM SERPL-SCNC: 4.4 MEQ/L (ref 3.4–4.9)
RBC # BLD: 4.47 M/UL (ref 4.2–5.4)
SODIUM BLD-SCNC: 141 MEQ/L (ref 135–144)
T4 FREE: 1.24 NG/DL (ref 0.84–1.68)
TOTAL PROTEIN: 7 G/DL (ref 6.3–8)
TRIGL SERPL-MCNC: 117 MG/DL (ref 0–150)
TSH REFLEX: 4.26 UIU/ML (ref 0.44–3.86)
WBC # BLD: 4.1 K/UL (ref 4.8–10.8)

## 2021-02-21 ENCOUNTER — HOSPITAL ENCOUNTER (OUTPATIENT)
Age: 66
Setting detail: OBSERVATION
Discharge: HOME | End: 2021-02-23
Attending: FAMILY MEDICINE | Admitting: INTERNAL MEDICINE
Payer: MEDICARE

## 2021-02-21 ENCOUNTER — APPOINTMENT (OUTPATIENT)
Dept: CT IMAGING | Age: 66
End: 2021-02-21
Payer: MEDICARE

## 2021-02-21 DIAGNOSIS — R42 VERTIGO: Primary | ICD-10-CM

## 2021-02-21 PROBLEM — R11.2 N&V (NAUSEA AND VOMITING): Status: ACTIVE | Noted: 2021-02-21

## 2021-02-21 LAB
ALBUMIN SERPL-MCNC: 4.6 G/DL (ref 3.5–4.6)
ALP BLD-CCNC: 58 U/L (ref 40–130)
ALT SERPL-CCNC: 17 U/L (ref 0–33)
ANION GAP SERPL CALCULATED.3IONS-SCNC: 14 MEQ/L (ref 9–15)
AST SERPL-CCNC: 22 U/L (ref 0–35)
BASOPHILS ABSOLUTE: 0.1 K/UL (ref 0–0.2)
BASOPHILS RELATIVE PERCENT: 0.5 %
BILIRUB SERPL-MCNC: 0.4 MG/DL (ref 0.2–0.7)
BUN BLDV-MCNC: 9 MG/DL (ref 8–23)
CALCIUM SERPL-MCNC: 9.7 MG/DL (ref 8.5–9.9)
CHLORIDE BLD-SCNC: 103 MEQ/L (ref 95–107)
CO2: 23 MEQ/L (ref 20–31)
CREAT SERPL-MCNC: 0.73 MG/DL (ref 0.5–0.9)
EOSINOPHILS ABSOLUTE: 0.1 K/UL (ref 0–0.7)
EOSINOPHILS RELATIVE PERCENT: 0.9 %
GFR AFRICAN AMERICAN: >60
GFR NON-AFRICAN AMERICAN: >60
GLOBULIN: 3.1 G/DL (ref 2.3–3.5)
GLUCOSE BLD-MCNC: 99 MG/DL (ref 70–99)
HCT VFR BLD CALC: 46.9 % (ref 37–47)
HEMOGLOBIN: 15.8 G/DL (ref 12–16)
LYMPHOCYTES ABSOLUTE: 2.4 K/UL (ref 1–4.8)
LYMPHOCYTES RELATIVE PERCENT: 23.9 %
MCH RBC QN AUTO: 30.6 PG (ref 27–31.3)
MCHC RBC AUTO-ENTMCNC: 33.7 % (ref 33–37)
MCV RBC AUTO: 90.8 FL (ref 82–100)
MONOCYTES ABSOLUTE: 0.7 K/UL (ref 0.2–0.8)
MONOCYTES RELATIVE PERCENT: 6.5 %
NEUTROPHILS ABSOLUTE: 7 K/UL (ref 1.4–6.5)
NEUTROPHILS RELATIVE PERCENT: 68.2 %
PDW BLD-RTO: 14.1 % (ref 11.5–14.5)
PLATELET # BLD: 269 K/UL (ref 130–400)
POTASSIUM SERPL-SCNC: 3.4 MEQ/L (ref 3.4–4.9)
RBC # BLD: 5.16 M/UL (ref 4.2–5.4)
SARS-COV-2, NAAT: NOT DETECTED
SODIUM BLD-SCNC: 140 MEQ/L (ref 135–144)
TOTAL PROTEIN: 7.7 G/DL (ref 6.3–8)
WBC # BLD: 10.2 K/UL (ref 4.8–10.8)

## 2021-02-21 PROCEDURE — 96375 TX/PRO/DX INJ NEW DRUG ADDON: CPT

## 2021-02-21 PROCEDURE — G0378 HOSPITAL OBSERVATION PER HR: HCPCS

## 2021-02-21 PROCEDURE — 6360000002 HC RX W HCPCS: Performed by: FAMILY MEDICINE

## 2021-02-21 PROCEDURE — 96376 TX/PRO/DX INJ SAME DRUG ADON: CPT

## 2021-02-21 PROCEDURE — 96374 THER/PROPH/DIAG INJ IV PUSH: CPT

## 2021-02-21 PROCEDURE — 6370000000 HC RX 637 (ALT 250 FOR IP): Performed by: NURSE PRACTITIONER

## 2021-02-21 PROCEDURE — 87635 SARS-COV-2 COVID-19 AMP PRB: CPT

## 2021-02-21 PROCEDURE — 80053 COMPREHEN METABOLIC PANEL: CPT

## 2021-02-21 PROCEDURE — 6360000002 HC RX W HCPCS

## 2021-02-21 PROCEDURE — 2580000003 HC RX 258: Performed by: NURSE PRACTITIONER

## 2021-02-21 PROCEDURE — 2580000003 HC RX 258: Performed by: FAMILY MEDICINE

## 2021-02-21 PROCEDURE — 36415 COLL VENOUS BLD VENIPUNCTURE: CPT

## 2021-02-21 PROCEDURE — 70450 CT HEAD/BRAIN W/O DYE: CPT

## 2021-02-21 PROCEDURE — 96361 HYDRATE IV INFUSION ADD-ON: CPT

## 2021-02-21 PROCEDURE — 85025 COMPLETE CBC W/AUTO DIFF WBC: CPT

## 2021-02-21 PROCEDURE — 99285 EMERGENCY DEPT VISIT HI MDM: CPT

## 2021-02-21 PROCEDURE — 6370000000 HC RX 637 (ALT 250 FOR IP): Performed by: FAMILY MEDICINE

## 2021-02-21 RX ORDER — MECLIZINE HYDROCHLORIDE 25 MG/1
25 TABLET ORAL ONCE
Status: COMPLETED | OUTPATIENT
Start: 2021-02-21 | End: 2021-02-21

## 2021-02-21 RX ORDER — ACETAMINOPHEN 325 MG/1
650 TABLET ORAL EVERY 6 HOURS PRN
Status: DISCONTINUED | OUTPATIENT
Start: 2021-02-21 | End: 2021-02-23 | Stop reason: HOSPADM

## 2021-02-21 RX ORDER — SODIUM CHLORIDE 0.9 % (FLUSH) 0.9 %
10 SYRINGE (ML) INJECTION PRN
Status: DISCONTINUED | OUTPATIENT
Start: 2021-02-21 | End: 2021-02-23 | Stop reason: HOSPADM

## 2021-02-21 RX ORDER — ACETAMINOPHEN 650 MG/1
650 SUPPOSITORY RECTAL EVERY 6 HOURS PRN
Status: DISCONTINUED | OUTPATIENT
Start: 2021-02-21 | End: 2021-02-23 | Stop reason: HOSPADM

## 2021-02-21 RX ORDER — SODIUM CHLORIDE 9 MG/ML
INJECTION, SOLUTION INTRAVENOUS CONTINUOUS
Status: DISCONTINUED | OUTPATIENT
Start: 2021-02-21 | End: 2021-02-23 | Stop reason: HOSPADM

## 2021-02-21 RX ORDER — MECLIZINE HYDROCHLORIDE 25 MG/1
25 TABLET ORAL 3 TIMES DAILY PRN
Status: DISCONTINUED | OUTPATIENT
Start: 2021-02-21 | End: 2021-02-22

## 2021-02-21 RX ORDER — ONDANSETRON 2 MG/ML
4 INJECTION INTRAMUSCULAR; INTRAVENOUS ONCE
Status: COMPLETED | OUTPATIENT
Start: 2021-02-21 | End: 2021-02-21

## 2021-02-21 RX ORDER — ONDANSETRON 2 MG/ML
4 INJECTION INTRAMUSCULAR; INTRAVENOUS EVERY 6 HOURS PRN
Status: DISCONTINUED | OUTPATIENT
Start: 2021-02-21 | End: 2021-02-22 | Stop reason: SDUPTHER

## 2021-02-21 RX ORDER — 0.9 % SODIUM CHLORIDE 0.9 %
1000 INTRAVENOUS SOLUTION INTRAVENOUS ONCE
Status: COMPLETED | OUTPATIENT
Start: 2021-02-21 | End: 2021-02-21

## 2021-02-21 RX ORDER — POLYETHYLENE GLYCOL 3350 17 G/17G
17 POWDER, FOR SOLUTION ORAL DAILY PRN
Status: DISCONTINUED | OUTPATIENT
Start: 2021-02-21 | End: 2021-02-23 | Stop reason: HOSPADM

## 2021-02-21 RX ORDER — LORAZEPAM 2 MG/ML
1 INJECTION INTRAMUSCULAR ONCE
Status: COMPLETED | OUTPATIENT
Start: 2021-02-21 | End: 2021-02-21

## 2021-02-21 RX ORDER — PROMETHAZINE HYDROCHLORIDE 12.5 MG/1
12.5 TABLET ORAL EVERY 6 HOURS PRN
Status: DISCONTINUED | OUTPATIENT
Start: 2021-02-21 | End: 2021-02-22 | Stop reason: SDUPTHER

## 2021-02-21 RX ORDER — SODIUM CHLORIDE 0.9 % (FLUSH) 0.9 %
10 SYRINGE (ML) INJECTION EVERY 12 HOURS SCHEDULED
Status: DISCONTINUED | OUTPATIENT
Start: 2021-02-21 | End: 2021-02-23 | Stop reason: HOSPADM

## 2021-02-21 RX ORDER — ONDANSETRON 2 MG/ML
INJECTION INTRAMUSCULAR; INTRAVENOUS
Status: COMPLETED
Start: 2021-02-21 | End: 2021-02-21

## 2021-02-21 RX ORDER — CHOLECALCIFEROL (VITAMIN D3) 125 MCG
5 CAPSULE ORAL NIGHTLY PRN
Status: DISCONTINUED | OUTPATIENT
Start: 2021-02-21 | End: 2021-02-23 | Stop reason: HOSPADM

## 2021-02-21 RX ADMIN — LORAZEPAM 1 MG: 2 INJECTION, SOLUTION INTRAMUSCULAR; INTRAVENOUS at 15:37

## 2021-02-21 RX ADMIN — PROMETHAZINE HYDROCHLORIDE 12.5 MG: 12.5 TABLET ORAL at 23:47

## 2021-02-21 RX ADMIN — MECLIZINE HYDROCHLORIDE 25 MG: 25 TABLET ORAL at 19:12

## 2021-02-21 RX ADMIN — SODIUM CHLORIDE 1000 ML: 9 INJECTION, SOLUTION INTRAVENOUS at 15:37

## 2021-02-21 RX ADMIN — Medication 5 MG: at 23:47

## 2021-02-21 RX ADMIN — SODIUM CHLORIDE, PRESERVATIVE FREE 10 ML: 5 INJECTION INTRAVENOUS at 21:23

## 2021-02-21 RX ADMIN — SODIUM CHLORIDE: 9 INJECTION, SOLUTION INTRAVENOUS at 23:47

## 2021-02-21 RX ADMIN — ONDANSETRON 4 MG: 2 INJECTION INTRAMUSCULAR; INTRAVENOUS at 18:39

## 2021-02-21 RX ADMIN — SODIUM CHLORIDE, PRESERVATIVE FREE 10 ML: 5 INJECTION INTRAVENOUS at 23:47

## 2021-02-21 RX ADMIN — MECLIZINE HYDROCHLORIDE 25 MG: 25 TABLET ORAL at 23:47

## 2021-02-21 RX ADMIN — MECLIZINE HYDROCHLORIDE 25 MG: 25 TABLET ORAL at 17:52

## 2021-02-21 RX ADMIN — ONDANSETRON 4 MG: 2 INJECTION INTRAMUSCULAR; INTRAVENOUS at 15:37

## 2021-02-21 ASSESSMENT — ENCOUNTER SYMPTOMS
GASTROINTESTINAL NEGATIVE: 1
VOMITING: 0
VISUAL CHANGE: 0
VOMITING: 1
ALLERGIC/IMMUNOLOGIC NEGATIVE: 1
NAUSEA: 0
TROUBLE SWALLOWING: 0
DIARRHEA: 0
NAUSEA: 1
SORE THROAT: 0
SINUS PRESSURE: 0
ABDOMINAL PAIN: 0
EYES NEGATIVE: 1
SHORTNESS OF BREATH: 0
SINUS PAIN: 0
WHEEZING: 0
RESPIRATORY NEGATIVE: 1
COUGH: 0
BLOOD IN STOOL: 0

## 2021-02-21 NOTE — ED NOTES
Bed: 20  Expected date:   Expected time:   Means of arrival:   Comments:  Female kimani Rincon, RN  02/21/21 [id number]

## 2021-02-21 NOTE — ED TRIAGE NOTES
Pt states dizziness since last night. Pt is A&Ox4, skin intact, msps intact, afebrile, breaths are equal and unlabored. Pt has hx of vertigo.

## 2021-02-22 ENCOUNTER — TELEPHONE (OUTPATIENT)
Dept: FAMILY MEDICINE CLINIC | Age: 66
End: 2021-02-22

## 2021-02-22 ENCOUNTER — APPOINTMENT (OUTPATIENT)
Dept: MRI IMAGING | Age: 66
End: 2021-02-22
Payer: MEDICARE

## 2021-02-22 ENCOUNTER — APPOINTMENT (OUTPATIENT)
Dept: ULTRASOUND IMAGING | Age: 66
End: 2021-02-22
Payer: MEDICARE

## 2021-02-22 LAB
ALBUMIN SERPL-MCNC: 3.7 G/DL (ref 3.5–4.6)
ALP BLD-CCNC: 40 U/L (ref 40–130)
ALT SERPL-CCNC: 12 U/L (ref 0–33)
ANION GAP SERPL CALCULATED.3IONS-SCNC: 11 MEQ/L (ref 9–15)
ANION GAP SERPL CALCULATED.3IONS-SCNC: 9 MEQ/L (ref 9–15)
AST SERPL-CCNC: 13 U/L (ref 0–35)
BASOPHILS ABSOLUTE: 0 K/UL (ref 0–0.2)
BASOPHILS RELATIVE PERCENT: 0.6 %
BILIRUB SERPL-MCNC: 0.4 MG/DL (ref 0.2–0.7)
BUN BLDV-MCNC: 9 MG/DL (ref 8–23)
BUN BLDV-MCNC: 9 MG/DL (ref 8–23)
CALCIUM SERPL-MCNC: 8.4 MG/DL (ref 8.5–9.9)
CALCIUM SERPL-MCNC: 8.6 MG/DL (ref 8.5–9.9)
CHLORIDE BLD-SCNC: 107 MEQ/L (ref 95–107)
CHLORIDE BLD-SCNC: 108 MEQ/L (ref 95–107)
CK MB: 1.1 NG/ML (ref 0–3.8)
CK MB: 1.1 NG/ML (ref 0–3.8)
CO2: 22 MEQ/L (ref 20–31)
CO2: 23 MEQ/L (ref 20–31)
CREAT SERPL-MCNC: 0.73 MG/DL (ref 0.5–0.9)
CREAT SERPL-MCNC: 0.74 MG/DL (ref 0.5–0.9)
CREATINE KINASE-MB INDEX: 1.6 % (ref 0–3.5)
CREATINE KINASE-MB INDEX: 1.9 % (ref 0–3.5)
EKG ATRIAL RATE: 68 BPM
EKG P AXIS: 42 DEGREES
EKG P-R INTERVAL: 172 MS
EKG Q-T INTERVAL: 444 MS
EKG QRS DURATION: 86 MS
EKG QTC CALCULATION (BAZETT): 472 MS
EKG R AXIS: 20 DEGREES
EKG T AXIS: 55 DEGREES
EKG VENTRICULAR RATE: 68 BPM
EOSINOPHILS ABSOLUTE: 0.1 K/UL (ref 0–0.7)
EOSINOPHILS RELATIVE PERCENT: 1 %
GFR AFRICAN AMERICAN: >60
GFR AFRICAN AMERICAN: >60
GFR NON-AFRICAN AMERICAN: >60
GFR NON-AFRICAN AMERICAN: >60
GLOBULIN: 2.4 G/DL (ref 2.3–3.5)
GLUCOSE BLD-MCNC: 91 MG/DL (ref 70–99)
GLUCOSE BLD-MCNC: 96 MG/DL (ref 70–99)
HCT VFR BLD CALC: 39 % (ref 37–47)
HEMOGLOBIN: 13.3 G/DL (ref 12–16)
LYMPHOCYTES ABSOLUTE: 2 K/UL (ref 1–4.8)
LYMPHOCYTES RELATIVE PERCENT: 25.2 %
MCH RBC QN AUTO: 30.4 PG (ref 27–31.3)
MCHC RBC AUTO-ENTMCNC: 34 % (ref 33–37)
MCV RBC AUTO: 89.4 FL (ref 82–100)
MONOCYTES ABSOLUTE: 0.6 K/UL (ref 0.2–0.8)
MONOCYTES RELATIVE PERCENT: 7.9 %
NEUTROPHILS ABSOLUTE: 5.1 K/UL (ref 1.4–6.5)
NEUTROPHILS RELATIVE PERCENT: 65.3 %
PDW BLD-RTO: 13.7 % (ref 11.5–14.5)
PLATELET # BLD: 227 K/UL (ref 130–400)
POTASSIUM REFLEX MAGNESIUM: 3.6 MEQ/L (ref 3.4–4.9)
POTASSIUM REFLEX MAGNESIUM: 3.7 MEQ/L (ref 3.4–4.9)
RBC # BLD: 4.36 M/UL (ref 4.2–5.4)
SODIUM BLD-SCNC: 140 MEQ/L (ref 135–144)
SODIUM BLD-SCNC: 140 MEQ/L (ref 135–144)
TOTAL CK: 58 U/L (ref 0–170)
TOTAL CK: 68 U/L (ref 0–170)
TOTAL PROTEIN: 6.1 G/DL (ref 6.3–8)
TROPONIN: <0.01 NG/ML (ref 0–0.01)
TROPONIN: <0.01 NG/ML (ref 0–0.01)
WBC # BLD: 7.8 K/UL (ref 4.8–10.8)

## 2021-02-22 PROCEDURE — 82553 CREATINE MB FRACTION: CPT

## 2021-02-22 PROCEDURE — 93880 EXTRACRANIAL BILAT STUDY: CPT

## 2021-02-22 PROCEDURE — 6370000000 HC RX 637 (ALT 250 FOR IP): Performed by: PSYCHIATRY & NEUROLOGY

## 2021-02-22 PROCEDURE — 6370000000 HC RX 637 (ALT 250 FOR IP): Performed by: INTERNAL MEDICINE

## 2021-02-22 PROCEDURE — G0378 HOSPITAL OBSERVATION PER HR: HCPCS

## 2021-02-22 PROCEDURE — 93005 ELECTROCARDIOGRAM TRACING: CPT | Performed by: INTERNAL MEDICINE

## 2021-02-22 PROCEDURE — 6370000000 HC RX 637 (ALT 250 FOR IP): Performed by: NURSE PRACTITIONER

## 2021-02-22 PROCEDURE — 6360000002 HC RX W HCPCS: Performed by: FAMILY MEDICINE

## 2021-02-22 PROCEDURE — 93010 ELECTROCARDIOGRAM REPORT: CPT | Performed by: INTERNAL MEDICINE

## 2021-02-22 PROCEDURE — 80053 COMPREHEN METABOLIC PANEL: CPT

## 2021-02-22 PROCEDURE — 99219 PR INITIAL OBSERVATION CARE/DAY 50 MINUTES: CPT | Performed by: PSYCHIATRY & NEUROLOGY

## 2021-02-22 PROCEDURE — 2580000003 HC RX 258: Performed by: NURSE PRACTITIONER

## 2021-02-22 PROCEDURE — 70551 MRI BRAIN STEM W/O DYE: CPT

## 2021-02-22 PROCEDURE — 82550 ASSAY OF CK (CPK): CPT

## 2021-02-22 PROCEDURE — 97161 PT EVAL LOW COMPLEX 20 MIN: CPT

## 2021-02-22 PROCEDURE — 84484 ASSAY OF TROPONIN QUANT: CPT

## 2021-02-22 PROCEDURE — 85025 COMPLETE CBC W/AUTO DIFF WBC: CPT

## 2021-02-22 PROCEDURE — 96376 TX/PRO/DX INJ SAME DRUG ADON: CPT

## 2021-02-22 PROCEDURE — 36415 COLL VENOUS BLD VENIPUNCTURE: CPT

## 2021-02-22 RX ORDER — ROSUVASTATIN CALCIUM 40 MG/1
40 TABLET, COATED ORAL NIGHTLY
Status: DISCONTINUED | OUTPATIENT
Start: 2021-02-22 | End: 2021-02-23 | Stop reason: HOSPADM

## 2021-02-22 RX ORDER — ASPIRIN 81 MG/1
81 TABLET ORAL DAILY
Qty: 30 TABLET | Refills: 3 | Status: SHIPPED | OUTPATIENT
Start: 2021-02-23 | End: 2021-12-14

## 2021-02-22 RX ORDER — MECLIZINE HYDROCHLORIDE 25 MG/1
25 TABLET ORAL 3 TIMES DAILY
Qty: 30 TABLET | Refills: 0 | Status: SHIPPED | OUTPATIENT
Start: 2021-02-22 | End: 2021-02-23

## 2021-02-22 RX ORDER — SCOLOPAMINE TRANSDERMAL SYSTEM 1 MG/1
1 PATCH, EXTENDED RELEASE TRANSDERMAL
Qty: 10 PATCH | Refills: 0 | Status: SHIPPED | OUTPATIENT
Start: 2021-02-22 | End: 2021-02-23

## 2021-02-22 RX ORDER — SCOLOPAMINE TRANSDERMAL SYSTEM 1 MG/1
1 PATCH, EXTENDED RELEASE TRANSDERMAL
Status: DISCONTINUED | OUTPATIENT
Start: 2021-02-22 | End: 2021-02-23 | Stop reason: HOSPADM

## 2021-02-22 RX ORDER — LABETALOL HYDROCHLORIDE 5 MG/ML
10 INJECTION, SOLUTION INTRAVENOUS EVERY 10 MIN PRN
Status: DISCONTINUED | OUTPATIENT
Start: 2021-02-22 | End: 2021-02-23 | Stop reason: HOSPADM

## 2021-02-22 RX ORDER — ASPIRIN 81 MG/1
81 TABLET ORAL DAILY
Status: DISCONTINUED | OUTPATIENT
Start: 2021-02-22 | End: 2021-02-23 | Stop reason: HOSPADM

## 2021-02-22 RX ORDER — ASPIRIN 300 MG/1
300 SUPPOSITORY RECTAL DAILY
Status: DISCONTINUED | OUTPATIENT
Start: 2021-02-22 | End: 2021-02-23 | Stop reason: HOSPADM

## 2021-02-22 RX ORDER — LORAZEPAM 2 MG/ML
0.5 INJECTION INTRAMUSCULAR ONCE
Status: COMPLETED | OUTPATIENT
Start: 2021-02-22 | End: 2021-02-22

## 2021-02-22 RX ORDER — MECLIZINE HYDROCHLORIDE 25 MG/1
25 TABLET ORAL 3 TIMES DAILY
Status: DISCONTINUED | OUTPATIENT
Start: 2021-02-22 | End: 2021-02-23 | Stop reason: HOSPADM

## 2021-02-22 RX ORDER — ROSUVASTATIN CALCIUM 40 MG/1
40 TABLET, COATED ORAL NIGHTLY
Qty: 30 TABLET | Refills: 3 | Status: SHIPPED | OUTPATIENT
Start: 2021-02-22 | End: 2022-08-01 | Stop reason: CLARIF

## 2021-02-22 RX ORDER — PROMETHAZINE HYDROCHLORIDE 12.5 MG/1
12.5 TABLET ORAL EVERY 6 HOURS PRN
Status: DISCONTINUED | OUTPATIENT
Start: 2021-02-22 | End: 2021-02-23 | Stop reason: HOSPADM

## 2021-02-22 RX ORDER — ONDANSETRON 2 MG/ML
4 INJECTION INTRAMUSCULAR; INTRAVENOUS EVERY 6 HOURS PRN
Status: DISCONTINUED | OUTPATIENT
Start: 2021-02-22 | End: 2021-02-23 | Stop reason: HOSPADM

## 2021-02-22 RX ADMIN — Medication 5 MG: at 22:23

## 2021-02-22 RX ADMIN — MECLIZINE HYDROCHLORIDE 25 MG: 25 TABLET ORAL at 14:27

## 2021-02-22 RX ADMIN — ASPIRIN 81 MG: 81 TABLET, COATED ORAL at 08:35

## 2021-02-22 RX ADMIN — LORAZEPAM 0.5 MG: 2 INJECTION INTRAMUSCULAR; INTRAVENOUS at 08:35

## 2021-02-22 RX ADMIN — SODIUM CHLORIDE: 9 INJECTION, SOLUTION INTRAVENOUS at 14:27

## 2021-02-22 RX ADMIN — SODIUM CHLORIDE, PRESERVATIVE FREE 10 ML: 5 INJECTION INTRAVENOUS at 22:24

## 2021-02-22 RX ADMIN — MECLIZINE HYDROCHLORIDE 25 MG: 25 TABLET ORAL at 22:22

## 2021-02-22 ASSESSMENT — ENCOUNTER SYMPTOMS
COLOR CHANGE: 0
BACK PAIN: 0
PHOTOPHOBIA: 0
CHOKING: 0
SHORTNESS OF BREATH: 0
NAUSEA: 0
TROUBLE SWALLOWING: 0
VOMITING: 0

## 2021-02-22 NOTE — PROGRESS NOTES
[address]   Facility/Department: [facility]  Speech Language Pathology    Unkeith Bhagat  1955  [id number]    Date: 2/22/2021      Speech Therapy attempted to see Marky Bhagat on this date for a/an:    Clinical Bedside Swallow Evaluation and Speech Language Evaluation    Pt was unable to be seen due to: Other: Pt being picked up by transport for MRI upon SLP arrival. Will re-attempt when able.          Electronically signed by Shelli Cockayne, SLP on 2/22/21 at 8:42 AM EST

## 2021-02-22 NOTE — CONSULTS
[address]                      [address], [address] [facility]                                  CONSULTATION    PATIENT NAME: Jonathan Maxwell                     :        1955  MED REC NO:   [id number]                            ROOM:       W288  ACCOUNT NO:   [de-identified]                           ADMIT DATE: 2021  PROVIDER:     Manuelito Ward MD    CONSULT DATE:  2021    REFERRING PROVIDER:  Francisco Javier Beckett CNP    REASON FOR CONSULTATION:  ENT evaluation and management for vertigo. HISTORY OF PRESENT ILLNESS:  This is a 62-year-old female who presented  to the emergency room with 24-hour duration of vertigo. The patient had  the feeling that the room is spinning with persistent symptoms. She has  history of similar problem in the past and resolved with medication for  dizziness. The patient's chart was reviewed and labs were reviewed. The patient just had MRI and carotid ultrasound. PAST SURGICAL HISTORY:  Appendectomy, colonoscopy, hysterectomy,  salpingo-oophorectomy. PHYSICAL EXAMINATION:  GENERAL:  This patient is in room in stable condition, in no distress. Somewhat sleepy from medications. VITAL SIGNS:  Stable. HEENT:  Examination of the ears reveal no auricular lesions and ear  canals are clear. Tympanic membranes intact bilaterally. Nose, no  external nasal deformity. Anterior rhinoscopy, mucosa is congested. Mild deviated nasal septum. Oral cavity, no mucosal lesions. Oropharynx is clear. Uvula is midline. Tongue movement within normal  limits. IMPRESSION:  1. Vertigo, acute. 2.  Vertigo, recurrent. 3.  Tinnitus. PLAN:  I will review the x-rays. We will review the ultrasound of  carotid. We will follow the patient while in the hospital and we will  follow the patient in the office.         Fabio Daniels MD    D: 2021 11:18:42       T: 2021 12:10:30     [email or web address]  Job#: [id number] Doc#: [id number]    CC:   SKYLAR Friedman MD

## 2021-02-22 NOTE — CARE COORDINATION
[facility] [place] Case Management Initial Discharge Assessment    Met with patient at bedside in ER and spoke with patient's spouse via phone call to discuss discharge plan. PCP: Nargis Tolentino MD                                  Date of Last Visit: 11/2/2020  If no PCP, list provided? N/A    Discharge Planning    Living Arrangements: Patient lives independently at home in a two-story house with bed/bath on the 2nd floor. Who do you live with? Spouse John Humphreys ([phone] or [phone]). Who helps you with your care:  Patient is fully independent with ADLs and IADLs. If lives at home:  Do you have any barriers navigating in your home? No    Patient can perform ADL? Yes    Current Services (outpatient and in home) :  None    Dialysis: No    Is transportation available to get to your appointments? Yes    DME Equipment:  None    Respiratory equipment: None    Respiratory provider:  DWIGHT     Pharmacy:  [facility] with Medication Assistance Program?  No      Patient agreeable to [place] [id number]? N/A    Patient agreeable to SNF/Rehab? N/A    Other discharge needs identified? N/A - Patient/spouse deny needs at DC. [place] of choice list provided with basic dialogue that supports the patient's individualized plan of care/goals and shares the quality data associated with the providers. N/A    Does Patient Have a High-Risk for Readmission Diagnosis (CHF, PN, MI, COPD)? No    The plan for Transition of Care is related to the following treatment goals: Eval/mgmt of persistent dizziness and nausea. Initial Discharge Plan? (Note: please see concurrent daily documentation for any updates after initial note). Home with spouse. The Patient and/or patient representative: Patient/spouse were provided with notice of choice of any post-acute providers for care and equipment (both deny needs at DC) and agrees with discharge plan to home.   Yes    Electronically signed by Lora Amos RN on 2/21/2021 at 8:18 PM

## 2021-02-22 NOTE — H&P
[facility]  MEDICINE    HISTORY AND PHYSICAL EXAM    PATIENT NAME:  Doug Allen    MRN:  [id number]  SERVICE DATE:  2/21/2021   SERVICE TIME:  8:31 PM    Primary Care Physician: Nargis Tolentino MD     SUBJECTIVE  CHIEF COMPLAINT:  Dizziness, N/V.     HPI:  Doug Allen is a 76 y.o., , female who  has no past medical history on file. that is hospitalized for vertigo, N/V.     Dizziness  This is a recurrent problem. The current episode started today. The problem occurs constantly. The problem has been unchanged. Associated symptoms include nausea and vomiting. Pertinent negatives include no abdominal pain, arthralgias, chest pain, chills, congestion, coughing, fever, rash or sore throat. Exacerbated by: any head movement. Treatments tried: removed some wax from ear believing it would help but symptoms persist.        PAST MEDICAL HISTORY:  History reviewed. No pertinent past medical history.   PAST SURGICAL HISTORY:    Past Surgical History:   Procedure Laterality Date    APPENDECTOMY  1959    COLONOSCOPY N/A 9/16/2019    COLORECTAL CANCER SCREENING, NOT HIGH RISK performed by Jt Quesada MD at [facility]:    Family History   Problem Relation Age of Onset    Osteoarthritis Mother     Migraines Mother     Heart Failure Father     Osteoarthritis Father     Heart Disease Father      SOCIAL HISTORY:    Social History     Socioeconomic History    Marital status:      Spouse name: Not on file    Number of children: Not on file    Years of education: Not on file    Highest education level: Not on file   Occupational History    Not on file   Social Needs    Financial resource strain: Not on file    Food insecurity     Worry: Not on file     Inability: Not on file    Transportation needs     Medical: Not on file     Non-medical: Not on file   Tobacco Use    Smoking status: Never Smoker    Smokeless tobacco: Never Used   Substance and Sexual Activity    Alcohol use: Not on file     Comment: socially     Drug use: Never    Sexual activity: Yes     Comment:     Lifestyle    Physical activity     Days per week: Not on file     Minutes per session: Not on file    Stress: Not on file   Relationships    Social connections     Talks on phone: Not on file     Gets together: Not on file     Attends Jehovah's witness service: Not on file     Active member of club or organization: Not on file     Attends meetings of clubs or organizations: Not on file     Relationship status: Not on file    Intimate partner violence     Fear of current or ex partner: Not on file     Emotionally abused: Not on file     Physically abused: Not on file     Forced sexual activity: Not on file   Other Topics Concern    Not on file   Social History Narrative    Not on file     MEDICATIONS:   Prior to Admission medications    Medication Sig Start Date End Date Taking?  Authorizing Provider   estradiol (VIVELLE) 0.1 MG/24HR PLACE 1 PATCH ON THE SKIN TWICE A WEEK (PLEASE CONTACT DOCTOR) 9/8/20   Chris Mix MD   omega-3 acid ethyl esters (LOVAZA) 1 g capsule Take 2 capsules by mouth 2 times daily 4/6/20 4/6/21  Chris Mix MD   Magnesium Malate 1250 (141.7 Mg) MG TABS Take 2,500 mg by mouth once     Historical Provider, MD   Methylsulfonylmethane (MSM) 1000 MG CAPS Take 2,000 mg by mouth once     Historical Provider, MD   b complex vitamins capsule Take 1 capsule by mouth 2 times daily    Historical Provider, MD   vitamin B-12 (CYANOCOBALAMIN) 500 MCG tablet Take 5,000 mcg by mouth daily    Historical Provider, MD   Red Yeast Rice 600 MG TABS Take 1,200 mg by mouth once     Historical Provider, MD   Cholecalciferol (VITAMIN D3) 2000 units CAPS Take 2,000 Units by mouth daily    Historical Provider, MD   Probiotic Product (PROBIOTIC ADVANCED PO) Take 540 mg by mouth daily    Historical Provider, MD   folic acid (FOLVITE) 619 MCG tablet Take 800 mcg by mouth daily General: Skin is warm and dry. Capillary Refill: Capillary refill takes less than 2 seconds. Neurological:      Mental Status: She is alert and oriented to person, place, and time. Psychiatric:         Mood and Affect: Mood normal.         Behavior: Behavior normal.          /78   Pulse 82   Temp 98.8 °F (37.1 °C) (Oral)   Resp 16   Ht 5' 9\" (1.753 m)   Wt 165 lb (74.8 kg)   SpO2 93%   BMI 24.37 kg/m²     DATA:     Diagnostic tests reviewed for today's visit:    Most recent labs and imaging results reviewed.      LABS:    Recent Results (from the past 24 hour(s))   Comprehensive Metabolic Panel    Collection Time: 02/21/21  3:30 PM   Result Value Ref Range    Sodium 140 135 - 144 mEq/L    Potassium 3.4 3.4 - 4.9 mEq/L    Chloride 103 95 - 107 mEq/L    CO2 23 20 - 31 mEq/L    Anion Gap 14 9 - 15 mEq/L    Glucose 99 70 - 99 mg/dL    BUN 9 8 - 23 mg/dL    CREATININE 0.73 0.50 - 0.90 mg/dL    GFR Non-African American >60.0 >60    GFR  >60.0 >60    Calcium 9.7 8.5 - 9.9 mg/dL    Total Protein 7.7 6.3 - 8.0 g/dL    Albumin 4.6 3.5 - 4.6 g/dL    Total Bilirubin 0.4 0.2 - 0.7 mg/dL    Alkaline Phosphatase 58 40 - 130 U/L    ALT 17 0 - 33 U/L    AST 22 0 - 35 U/L    Globulin 3.1 2.3 - 3.5 g/dL   CBC Auto Differential    Collection Time: 02/21/21  3:30 PM   Result Value Ref Range    WBC 10.2 4.8 - 10.8 K/uL    RBC 5.16 4.20 - 5.40 M/uL    Hemoglobin 15.8 12.0 - 16.0 g/dL    Hematocrit 46.9 37.0 - 47.0 %    MCV 90.8 82.0 - 100.0 fL    MCH 30.6 27.0 - 31.3 pg    MCHC 33.7 33.0 - 37.0 %    RDW 14.1 11.5 - 14.5 %    Platelets 449 838 - 570 K/uL    Neutrophils % 68.2 %    Lymphocytes % 23.9 %    Monocytes % 6.5 %    Eosinophils % 0.9 %    Basophils % 0.5 %    Neutrophils Absolute 7.0 (H) 1.4 - 6.5 K/uL    Lymphocytes Absolute 2.4 1.0 - 4.8 K/uL    Monocytes Absolute 0.7 0.2 - 0.8 K/uL    Eosinophils Absolute 0.1 0.0 - 0.7 K/uL    Basophils Absolute 0.1 0.0 - 0.2 K/uL       IMAGING:  No results found.    VTE Prophylaxis: low molecular weight heparin -  start    ASSESSMENT AND PLAN  Principal Problem:    Vertigo  Persistent despite meclizine. Found to have severe vertigo mild horizontal nystagmus with rapid head movement severe dizziness and nausea on exam in ER. She has had this in the past but this episode is particularly severe. Any movement causes vomiting and her gait is altered d/t dizziness. Her coordination is altered. 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 N&V - w any head movement  Little improvement w zofran. No abdominal pain or distention. Plan: zofran, phenergan. IV hydration for now.          Plan of care discussed with: patient    SIGNATURE: ELIZABETH Juarez - CNP  DATE: February 21, 2021  TIME: 8:31 PM   Leroy Rivera DO  - supervising physician

## 2021-02-22 NOTE — ED NOTES
Pt awake and alert. No s/s of distress noted at this time. No concerns or needs at this time. Will continue to monitor.         Diana Mata RN  02/21/21 2654

## 2021-02-22 NOTE — PROGRESS NOTES
Physical Therapy Med Surg Initial Assessment  Facility/Department: [facility]  [id number]       NAME: Epifanio Joseph  :  (71 y.o.)  MRN: [id number]  CODE STATUS: Full Code    Date of Service: 2021    Patient Diagnosis(es): Vertigo [R42]   Chief Complaint   Patient presents with    Dizziness     n/v     Patient Active Problem List    Diagnosis Date Noted    Benign paroxysmal positional vertigo of right ear     Ataxia     Vertigo 2021    N&V (nausea and vomiting) 2021        Past Medical History:   Diagnosis Date    Hyperlipidemia      Past Surgical History:   Procedure Laterality Date    APPENDECTOMY      COLONOSCOPY N/A 2019    COLORECTAL CANCER SCREENING, NOT HIGH RISK performed by Kendra Son MD at [facility]       Chart Reviewed: Yes  Patient assessed for rehabilitation services?: Yes  Family / Caregiver Present: No  General Comment  Comments: ear canal deformities- excessive ear wax- pt attributes to cause of dizziness    Restrictions:  Restrictions/Precautions: Fall Risk(mod miller score)     SUBJECTIVE:      Pain  Pre Treatment Pain Screening  Intervention List: Patient able to continue with treatment    Post Treatment Pain Screenin/10       Prior Level of Function:  Social/Functional History  Lives With: Spouse( is retired)  Type of Home: House  Home Layout: Two level, Bed/Bath upstairs, 1/2 bath on main level  Home Access: Stairs to enter with rails  Entrance Stairs - Number of Steps: 3  Entrance Stairs - Rails: Left  Bathroom Shower/Tub: Walk-in shower  Bathroom Equipment: Shower chair, Grab bars in shower  ADL Assistance: Independent  Homemaking Assistance: Independent  Homemaking Responsibilities: Yes  Ambulation Assistance: Independent  Transfer Assistance: Independent  Active : Yes  Occupation: Retired  Type of occupation: [employer]    OBJECTIVE:   Vision: Within [facility]: Within Wheelchair Mobility Training, Neuromuscular Re-education, Home Exercise Program, Equipment Evaluation, Education, & procurement, ROM, Transfer Training, Gait Training, Manual Therapy - Soft Tissue Mobilization, Safety Education & Training, Balance Training, Endurance Training, Stair training, Pain Management, Patient/Caregiver Education & Training, Positioning  Safety Devices  Type of devices: Call light within reach, Left in bed, Bed alarm in place    Goals:  Long term goals  Long term goal 1: Pt will demonstrate improved balance evidenced by DGI 22/24 indicating safe ambulator. Long term goal 2: Pt will demonstrate stair negotiation 3 steps indep to allow pt to safely enter and exit her home. Long term goal 3: Pt will demonstrate amb > or = 200 ft with decreased dizziness indep to allow pt to safely amb inside her home at [facility]. AMPA (6 CLICK) BASIC MOBILITY  AM-PAC Inpatient Mobility Raw Score : 22    Therapy Time:   Individual   Time In 6784   Time Out 1202   Minutes [address] [place] [address] , [address] [place] [address], 02/22/21 at 1:02 PM         Definitions for assistance levels  Independent = pt does not require any physical supervision or assistance from another person for activity completion. Device may be needed.   Stand by assistance = pt requires verbal cues or instructions from another person, close to but not touching, to perform the activity  Minimal assistance= pt performs 75% or more of the activity; assistance is required to complete the activity  Moderate assistance= pt performs 50% of the activity; assistance is required to complete the activity  Maximal assistance = pt performs 25% of the activity; assistance is required to complete the activity  Dependent = pt requires total physical assistance to accomplish the task

## 2021-02-22 NOTE — PROGRESS NOTES
2050: Admission assessment complete, see flowsheet. Neuro WNL, exceptions are dizziness, nausea, vomiting. She is in bed, lights off, needs provided. 6950: Pt states she takes the supplement Luba-Darco for allergies to mold toxins as ordered by her Og Rodrgiuez. Her estrogen patch was removed by this RN. She states she is feeling better this morning and is able to sit up now without getting nauseated. Pt had no emesis on this shift. She is in bed, awake, denies needs at this time.

## 2021-02-22 NOTE — CONSULTS
Subjective:      Patient ID: Demi Crowell is a 76 y.o. female who presents today for dizziness. HPI 69-year right-handed female admitted dizziness. Patient considerable dizziness and she was not able to ambulate she had nausea and vomiting and therefore had to be admitted. Patient is feeling somewhat better we gave her some Antivert and Ativan. She just had an MRI. She does have history of such episode few years ago lasted for a few days. She has muffling in the right ear and has had some issues with the right ear for some time. We were consulted from the emergency room regarding the same. She tried to remove wax from the ear at home. She reports no fevers or headaches    Review of Systems   Constitutional: Negative for fever. HENT: Negative for ear pain, tinnitus and trouble swallowing. Eyes: Negative for photophobia and visual disturbance. Respiratory: Negative for choking and shortness of breath. Cardiovascular: Negative for chest pain and palpitations. Gastrointestinal: Negative for nausea and vomiting. Musculoskeletal: Negative for back pain, gait problem, joint swelling, myalgias, neck pain and neck stiffness. Skin: Negative for color change. Allergic/Immunologic: Negative for food allergies. Neurological: Negative for dizziness, tremors, seizures, syncope, facial asymmetry, speech difficulty, weakness, light-headedness, numbness and headaches. Psychiatric/Behavioral: Negative for behavioral problems, confusion, hallucinations and sleep disturbance.        Past Medical History:   Diagnosis Date    Hyperlipidemia      Past Surgical History:   Procedure Laterality Date    APPENDECTOMY  1959    COLONOSCOPY N/A 9/16/2019    COLORECTAL CANCER SCREENING, NOT HIGH RISK performed by Daniel Ornelas MD at [facility] History     Socioeconomic History    Marital status:      Spouse name: Not on file    Number of children: Not on file    Years of education: Not on file    Highest education level: Not on file   Occupational History    Not on file   Social Needs    Financial resource strain: Not on file    Food insecurity     Worry: Not on file     Inability: Not on file    Transportation needs     Medical: Not on file     Non-medical: Not on file   Tobacco Use    Smoking status: Never Smoker    Smokeless tobacco: Never Used   Substance and Sexual Activity    Alcohol use: Not on file     Comment: socially     Drug use: Never    Sexual activity: Yes     Comment:     Lifestyle    Physical activity     Days per week: Not on file     Minutes per session: Not on file    Stress: Not on file   Relationships    Social connections     Talks on phone: Not on file     Gets together: Not on file     Attends Zoroastrian service: Not on file     Active member of club or organization: Not on file     Attends meetings of clubs or organizations: Not on file     Relationship status: Not on file    Intimate partner violence     Fear of current or ex partner: Not on file     Emotionally abused: Not on file     Physically abused: Not on file     Forced sexual activity: Not on file   Other Topics Concern    Not on file   Social History Narrative    Not on file     Family History   Problem Relation Age of Onset    Osteoarthritis Mother     Migraines Mother     Heart Failure Father     Osteoarthritis Father     Heart Disease Father      Allergies   Allergen Reactions    Peppermint Oil Other (See Comments)     Sneezing, gagging without SOB     Current Facility-Administered Medications   Medication Dose Route Frequency Provider Last Rate Last Admin    promethazine (PHENERGAN) tablet 12.5 mg  12.5 mg Oral Q6H PRN aDriel Arizmendi, DO        Or    ondansetron (ZOFRAN) injection 4 mg  4 mg Intravenous Q6H PRN Dariel Arizmendi, DO        enoxaparin (LOVENOX) injection 40 mg  40 mg Subcutaneous Daily Perla Arizmendi, DO        aspirin EC tablet 81 mg  81 mg Oral Daily Ken Roberts Sedar, DO   81 mg at 02/22/21 7849    Or    aspirin suppository 300 mg  300 mg Rectal Daily Ken Dos Santosar, DO        labetalol (NORMODYNE;TRANDATE) injection 10 mg  10 mg Intravenous Q10 Min PRN Ken Dos Santosar, DO        rosuvastatin (CRESTOR) tablet 40 mg  40 mg Oral Nightly Ken Roberts Sedar, DO        meclizine (ANTIVERT) tablet 25 mg  25 mg Oral TID Margi Stokes MD        scopolamine (TRANSDERM-SCOP) transdermal patch 1 patch  1 patch Transdermal Q72H Margi Stokes MD        sodium chloride flush 0.9 % injection 10 mL  10 mL Intravenous 2 times per day Lillie Lotus, APRN - CNP   Stopped at 02/22/21 3383    sodium chloride flush 0.9 % injection 10 mL  10 mL Intravenous PRN Lillie Lotus, APRN - CNP   10 mL at 02/21/21 2347    polyethylene glycol (GLYCOLAX) packet 17 g  17 g Oral Daily PRN Lillie Lotus, APRN - CNP        acetaminophen (TYLENOL) tablet 650 mg  650 mg Oral Q6H PRN Lillie Lotus, APRN - CNP        Or    acetaminophen (TYLENOL) suppository 650 mg  650 mg Rectal Q6H PRN Lillie Lotus, APRN - CNP        0.9 % sodium chloride infusion   Intravenous Continuous Lillie Lotus, APRN -  mL/hr at 02/21/21 2347 New Bag at 02/21/21 2347    melatonin tablet 5 mg  5 mg Oral Nightly PRN Lillie Lotus, APRN - CNP   5 mg at 02/21/21 2347        Objective:   /63   Pulse 64   Temp 98.2 °F (36.8 °C) (Oral)   Resp 18   Ht 5' 9\" (1.753 m)   Wt 165 lb (74.8 kg)   SpO2 94%   BMI 24.37 kg/m²     Physical Exam  Vitals signs reviewed. Eyes:      Pupils: Pupils are equal, round, and reactive to light. Neck:      Musculoskeletal: Normal range of motion. Cardiovascular:      Rate and Rhythm: Normal rate and regular rhythm. Heart sounds: No murmur. Pulmonary:      Effort: Pulmonary effort is normal.      Breath sounds: Normal breath sounds.    Abdominal:      General: Bowel sounds are normal. Musculoskeletal: Normal range of motion. Skin:     General: Skin is warm. Neurological:      Mental Status: She is alert and oriented to person, place, and time. Cranial Nerves: No cranial nerve deficit. Sensory: No sensory deficit. Motor: No abnormal muscle tone. Coordination: Coordination normal.      Deep Tendon Reflexes: Reflexes are normal and symmetric. Babinski sign absent on the right side. Babinski sign absent on the left side. Psychiatric:         Mood and Affect: Mood normal.     We were not able to complete her Hallpike maneuver due to the dizziness but she does have nystagmus in the left gaze. We have not yet moved her as she is still quite dizzy    Ct Head Wo Contrast    Result Date: 2/22/2021  CT Brain Contrast medium:  Not utilized. History:  Dizziness Comparison:  August 16, 2007 Findings: Extra-axial spaces:  Normal. Intracranial hemorrhage:  None. Ventricular system:  Normal for age. Basal Cisterns:  Normal. Cerebral Parenchyma:  Normal. Midline Shift:  None. Cerebellum:  Normal. Paranasal sinuses and mastoid air cells:  Normal. Visualized Orbits:  Normal.     Impression: Negative CT brain All CT scans at this facility use dose modulation, iterative reconstruction, and/or weight based dosing when appropriate to reduce radiation dose to as low as reasonably achievable. Mri Brain Wo Contrast    Result Date: 2/22/2021  EXAMINATION: MRI BRAIN WO CONTRAST CLINICAL HISTORY:  vertigo, concern for brainstem stroke COMPARISONS: NONE AVAILABLE TECHNIQUE: Multiplanar multisequence images of the brain were obtained without contrast. Diffusion perfusion imaging was obtained. FINDINGS:  There are no extra-axial collections. There is no evidence of hemorrhage. There are no areas of perfusion diffusion signal abnormality to suggest ischemia.  The susceptibility images do not demonstrate a small area of artifact in the left frontal medial subcortical white matter which most likely from diameter data as defined by the Society of Radiologist in [address] [place] [address] Drive Radiology 2003; 316;[id number]\". Lab Results   Component Value Date    WBC 7.8 02/22/2021    RBC 4.36 02/22/2021    HGB 13.3 02/22/2021    HCT 39.0 02/22/2021    MCV 89.4 02/22/2021    MCH 30.4 02/22/2021    MCHC 34.0 02/22/2021    RDW 13.7 02/22/2021     02/22/2021     Lab Results   Component Value Date     02/22/2021    K 3.7 02/22/2021     02/22/2021    CO2 23 02/22/2021    BUN 9 02/22/2021    CREATININE 0.74 02/22/2021    GFRAA >60.0 02/22/2021    LABGLOM >60.0 02/22/2021    GLUCOSE 91 02/22/2021    PROT 6.1 02/22/2021    LABALBU 3.7 02/22/2021    CALCIUM 8.6 02/22/2021    BILITOT 0.4 02/22/2021    ALKPHOS 40 02/22/2021    AST 13 02/22/2021    ALT 12 02/22/2021     No results found for: PROTIME, INR  No results found for: TSH, CEBWLRBE40, FOLATE, FERRITIN, IRON, TIBC, PTRFSAT, TSH, FREET4  Lab Results   Component Value Date    TRIG 117 11/03/2020    HDL 78 11/03/2020    LDLCALC 124 11/03/2020     No results found for: LABAMPH, BARBSCNU, LABBENZ, CANNAB, COCAINESCRN, LABMETH, OPIATESCREENURINE, PHENCYCLIDINESCREENURINE, PPXUR, ETOH  No results found for: LITHIUM, DILFRTOT, VALPROATE    Assessment:   Benign positional vertigo with significant dizziness. Patient had to be admitted as patient was ataxic and she had nausea and vomiting with the same. Patient has some dysfunction in the right ear and has been seen by ENT. It is likely that this is the pathology. She has nystagmus to the left gaze. Recommended round-the-clock Antivert 3 times a day with scopolamine patch for now. MRI of the brain was reviewed there is no sustained stroke. She will slowly will be transferred into vestibular rehabilitation. We will see how she does for today and once she is able to eat and walk we will discharge her.     This consultation was discussed with the emergency room yesterday and there for the time taken    Tl Jurado MD, Jean Ruano, American Board of Psychiatry & Neurology  Board Certified in Vascular Neurology  Board Certified in Neuromuscular Medicine  Certified in [address] [place] [address] Ave:

## 2021-02-22 NOTE — ED NOTES
Pt awake and alert. No s/s of distress noted at this time. No concerns or needs at this time. Will continue to monitor.         Alie Williamson RN  02/21/21 2029

## 2021-02-22 NOTE — ED NOTES
Pt in bed with eyes closed. Respirations are even and unlabored. No s/s of distress noted at this time. Will continue to monitor.         Francis Medina RN  02/21/21 2002

## 2021-02-22 NOTE — FLOWSHEET NOTE
Spiritual Care Services     Summary of Visit:   responded to spiritual care consult concerning HCPOA, patient at this time wants to review the document and appreciated the information, when asked about her health, pt shared that everything seems a little dizzy right now, explored some of the meaning and asked if I would pray for her, patient accepted the request and prayed for comfort and healing, and for the medical team caring for the patient,     Spiritual Assessment/Intervention/Outcomes:    Encounter Summary  Services provided to[de-identified] (P) Patient  Referral/Consult From[de-identified] (P) Physician, Nurse  Support System: (P) Spouse  Continue Visiting: (P) No  Complexity of Encounter: (P) Moderate  Length of Encounter: (P) 15 minutes  Spiritual Assessment Completed: (P) Yes  Advance Care Planning: (P) Yes  Routine  Type: (P) Initial     Spiritual/Denominational  Type: (P) Spiritual support  Assessment: (P) Approachable, Calm, Concerns with suffering  Intervention: (P) Active listening, Sustaining presence/ Ministry of presence, Prayer  Outcome: (P) Comfort, Expressed gratitude, Engaged in conversation, Receptive        Advance Directives (For Healthcare)  Pre-existing DNR Comfort Care/DNR Arrest/DNI Order: No  Healthcare Directive: (P) No, patient does not have an advance directive for healthcare treatment  Information on Healthcare Directives Requested: (P) Yes  Patient Requests Assistance: (P) Yes, will do independently  Advance Directives: (P) Documents given                Care Plan:        [id number]   Electronically signed by Siobhan Juarez on 2/22/21 at 1:28 PM EST     To reach a  for emotional and spiritual support, place an [facility] consult request.   If a  is needed immediately, dial 0 and ask to page the on-call .

## 2021-02-22 NOTE — ACP (ADVANCE CARE PLANNING)
Advance Care Planning     Advance Care Planning Activator (Inpatient)  Conversation Note      Date of ACP Conversation: 2/21/2021    [employer] with: Patient with Decision Making Capacity at bedside in ER and spoke with patient's spouse via phone call. ACP Activator: [facility] Decision Maker:     Current Designated Health Care Decision Maker:     Primary Decision Maker: Juan Danielson - Spouse - [phone]    Secondary Decision Maker: Harmony Porter - Brother/Sister - [phone]     Click here to complete Healthcare Decision Makers including section of the Healthcare Decision Maker Relationship (ie \"Primary\")  Today we documented Decision Maker(s) consistent with Legal Next of Kin hierarchy. Care Preferences    Ventilation: \"If you were in your present state of health and suddenly became very ill and were unable to breathe on your own, what would your preference be about the use of a ventilator (breathing machine) if it were available to you? \"      Would the patient desire the use of ventilator (breathing machine)?: yes    \"If your health worsens and it becomes clear that your chance of recovery is unlikely, what would your preference be about the use of a ventilator (breathing machine) if it were available to you? \"     Would the patient desire the use of ventilator (breathing machine)?: Yes  Resuscitation  \"CPR works best to restart the heart when there is a sudden event, like a heart attack, in someone who is otherwise healthy. Unfortunately, CPR does not typically restart the heart for people who have serious health conditions or who are very sick. \"    \"In the event your heart stopped as a result of an underlying serious health condition, would you want attempts to be made to restart your heart (answer \"yes\" for attempt to resuscitate) or would you prefer a natural death (answer \"no\" for do not attempt to resuscitate)? \" yes     [] Yes   [] No   Educated Patient / Decision Maker regarding differences between Advance Directives and portable DNR orders. Length of ACP Conversation in minutes:      Conversation Outcomes:  [x] ACP discussion completed  [] Existing advance directive reviewed with patient; no changes to patient's previously recorded wishes  [] New Advance Directive completed  [] Portable Do Not Rescitate prepared for Provider review and signature  [] POLST/POST/MOLST/MOST prepared for Provider review and signature      Follow-up plan:    [x] Schedule follow-up conversation to continue planning  [x] Referred individual to Provider for additional questions/concerns   [] Advised patient/agent/surrogate to review completed ACP document and update if needed with changes in condition, patient preferences or care setting    [x] This note routed to one or more involved healthcare providers.

## 2021-02-22 NOTE — ED PROVIDER NOTES
[facility] ED  eMERGENCY dEPARTMENT eNCOUnter      Pt Name: Leonard Andres  MRN: [id number]  Varungfyoung 1955  Date of evaluation: 2/21/2021  Provider: Padmini Lobo MD    [address] [place] [address]       Chief Complaint   Patient presents with    Dizziness     n/v         HISTORY OF PRESENT ILLNESS   (Location/Symptom, Timing/Onset,Context/Setting, Quality, Duration, Modifying Factors, Severity)  Note limiting factors. Leonard Andres is a 76 y.o. female who presents to the emergency department dizziness nausea and vomiting     Patient presented to the ER with severe dizziness and vertigo had left earwax and pain and tried a candle to remove the wax at home was able to remove the wax but dizziness and vertigo have gotten worse since    The history is provided by the patient. Dizziness  Quality:  Vertigo and room spinning  Severity:  Severe  Onset quality:  Gradual  Duration:  4 hours  Timing:  Constant  Progression:  Worsening  Chronicity:  Recurrent  Context: eye movement and head movement    Context: not with inactivity, not with loss of consciousness, not with medication, not with physical activity, not when standing up and not when urinating    Relieved by:  Nothing  Worsened by: Movement, turning head and sitting upright  Ineffective treatments:  Closing eyes and being still  Associated symptoms: no blood in stool, no chest pain, no diarrhea, no headaches, no hearing loss, no nausea, no palpitations, no shortness of breath, no syncope, no tinnitus, no vision changes, no vomiting and no weakness    Risk factors: hx of vertigo    Risk factors: no anemia, no heart disease, no hx of stroke, no Meniere's disease, no multiple medications and no new medications        NursingNotes were reviewed. REVIEW OF SYSTEMS    (2-9 systems for level 4, 10 or more for level 5)     Review of Systems   Constitutional: Negative. HENT: Negative. Negative for hearing loss and tinnitus. Eyes: Negative.     Respiratory: Negative. Negative for shortness of breath. Cardiovascular: Negative. Negative for chest pain, palpitations and syncope. Gastrointestinal: Negative. Negative for blood in stool, diarrhea, nausea and vomiting. Endocrine: Negative. Genitourinary: Negative. Musculoskeletal: Negative. Skin: Negative. Allergic/Immunologic: Negative. Neurological: Positive for dizziness. Negative for weakness and headaches. Psychiatric/Behavioral: Negative. All other systems reviewed and are negative. Except as noted above the remainder of the review of systems was reviewed and negative. PAST MEDICAL HISTORY   History reviewed. No pertinent past medical history. SURGICALHISTORY       Past Surgical History:   Procedure Laterality Date    APPENDECTOMY  1959    COLONOSCOPY N/A 9/16/2019    COLORECTAL CANCER SCREENING, NOT HIGH RISK performed by Debbie Coto MD at [place] [address]       Previous Medications    B COMPLEX VITAMINS CAPSULE    Take 1 capsule by mouth 2 times daily    CHOLECALCIFEROL (VITAMIN D3) 2000 UNITS CAPS    Take 2,000 Units by mouth daily    ESTRADIOL (VIVELLE) 0.1 MG/24HR    PLACE 1 PATCH ON THE SKIN TWICE A WEEK (PLEASE CONTACT DOCTOR)    FIBER ADULT GUMMIES PO    Take 6 g by mouth once    FOLIC ACID (FOLVITE) 051 MCG TABLET    Take 800 mcg by mouth daily    MAGNESIUM MALATE 1250 (141.7 MG) MG TABS    Take 2,500 mg by mouth once     METHYLSULFONYLMETHANE (MSM) 1000 MG CAPS    Take 2,000 mg by mouth once     OMEGA-3 ACID ETHYL ESTERS (LOVAZA) 1 G CAPSULE    Take 2 capsules by mouth 2 times daily    PROBIOTIC PRODUCT (PROBIOTIC ADVANCED PO)    Take 540 mg by mouth daily    RED YEAST RICE 600 MG TABS    Take 1,200 mg by mouth once     TURMERIC PO    Take 900 mg by mouth    VITAMIN B-12 (CYANOCOBALAMIN) 500 MCG TABLET    Take 5,000 mcg by mouth daily       ALLERGIES     Patient has no known allergies.     FAMILY HISTORY Family History   Problem Relation Age of Onset    Osteoarthritis Mother     Migraines Mother     Heart Failure Father     Osteoarthritis Father     Heart Disease Father           SOCIAL HISTORY       Social History     Socioeconomic History    Marital status:      Spouse name: None    Number of children: None    Years of education: None    Highest education level: None   Occupational History    None   Social Needs    Financial resource strain: None    Food insecurity     Worry: None     Inability: None    Transportation needs     Medical: None     Non-medical: None   Tobacco Use    Smoking status: Never Smoker    Smokeless tobacco: Never Used   Substance and Sexual Activity    Alcohol use: None     Comment: socially     Drug use: Never    Sexual activity: Yes     Comment:     Lifestyle    Physical activity     Days per week: None     Minutes per session: None    Stress: None   Relationships    Social connections     Talks on phone: None     Gets together: None     Attends Advent service: None     Active member of club or organization: None     Attends meetings of clubs or organizations: None     Relationship status: None    Intimate partner violence     Fear of current or ex partner: None     Emotionally abused: None     Physically abused: None     Forced sexual activity: None   Other Topics Concern    None   Social History Narrative    None       SCREENINGS   NIH Stroke Scale  Interval: Baseline  Level of Consciousness (1a. ): Alert  LOC Questions (1b. ):  Answers both correctly  LOC Commands (1c. ): Performs both tasks correctly  Best Gaze (2. ): Normal  Visual (3. ): No visual loss  Facial Palsy (4. ): Normal symmetrical movement  Motor Arm, Left (5a. ): No drift  Motor Arm, Right (5b. ): No drift  Motor Leg, Left (6a. ): No drift  Motor Leg, Right (6b. ): No drift  Limb Ataxia (7. ): Absent  Sensory (8. ): Normal  Best Language (9. ): No aphasia  Dysarthria (10. ): Normal  Extinction and Inattention (11): No abnormality  Total: 0Glasgow Coma Scale  Eye Opening: Spontaneous  Best Verbal Response: Oriented  Best Motor Response: Obeys commands  [place] Coma Scale Score: 15 @FLOW([id number])@      PHYSICAL EXAM    (up to 7 for level 4, 8 or more for level 5)     ED Triage Vitals [02/21/21 1520]   BP Temp Temp Source Pulse Resp SpO2 Height Weight   138/85 98.8 °F (37.1 °C) Oral 77 16 98 % 5' 9\" (1.753 m) 165 lb (74.8 kg)       Physical Exam  Vitals signs and nursing note reviewed. Constitutional:       Appearance: She is well-developed. HENT:      Head: Normocephalic and atraumatic. Right Ear: Tympanic membrane, ear canal and external ear normal.      Left Ear: Tympanic membrane, ear canal and external ear normal.      Nose: Nose normal.      Mouth/Throat:      Mouth: Mucous membranes are moist.      Pharynx: Oropharynx is clear. Eyes:      Extraocular Movements: Extraocular movements intact. Pupils: Pupils are equal, round, and reactive to light. Neck:      Musculoskeletal: Normal range of motion and neck supple. Cardiovascular:      Rate and Rhythm: Normal rate and regular rhythm. Heart sounds: Normal heart sounds. Pulmonary:      Effort: Pulmonary effort is normal. No respiratory distress. Breath sounds: Normal breath sounds. No wheezing or rales. Chest:      Chest wall: No tenderness. Abdominal:      General: Bowel sounds are normal.      Palpations: Abdomen is soft. Musculoskeletal: Normal range of motion. Skin:     General: Skin is warm and dry. Neurological:      Mental Status: She is alert and oriented to person, place, and time. Cranial Nerves: Cranial nerves are intact. No cranial nerve deficit. Sensory: Sensation is intact. No sensory deficit. Motor: Motor function is intact. No abnormal muscle tone.       Coordination: Coordination abnormal. Finger-Nose-Finger Test abnormal.      Deep Tendon Reflexes: Reflexes normal.      Comments: Patient with severe vertigo mild horizontal nystagmus with rapid head movement severe dizziness and nausea   Psychiatric:         Behavior: Behavior normal.         Thought Content: Thought content normal.         Judgment: Judgment normal.         DIAGNOSTIC RESULTS     EKG: All EKG's are interpreted by the Emergency Department Physician who either signs or Co-signsthis chart in the absence of a cardiologist.         RADIOLOGY:   Ettie Rafi such as CT, Ultrasound and MRI are read by the radiologist. Plain radiographic images are visualized and preliminarily interpreted by the emergency physician with the below findings:         Interpretation per the Radiologist below, if available at the time ofthis note:    CT Head WO Contrast    (Results Pending)   normal Head/brain CT    ED BEDSIDE ULTRASOUND:   Performed by ED Physician - none    LABS:  Labs Reviewed   CBC WITH AUTO DIFFERENTIAL - Abnormal; Notable for the following components:       Result Value    Neutrophils Absolute 7.0 (*)     All other components within normal limits   COVID-19, RAPID   COMPREHENSIVE METABOLIC PANEL       All other labs were within normal range or not returned as of this dictation.     EMERGENCY DEPARTMENT COURSE and DIFFERENTIAL DIAGNOSIS/MDM:   Vitals:    Vitals:    02/21/21 1915 02/21/21 1930 02/21/21 1945 02/21/21 2000   BP: 127/80 131/78  123/78   Pulse: 80 78 78 82   Resp: 8 10 16 16   Temp:       TempSrc:       SpO2: 96% 93% 95% 93%   Weight:       Height:                    MDM  Number of Diagnoses or Management Options  Vertigo  Diagnosis management comments: 76years old presented to the ER with severe vertigo with nausea vomiting that started this morning also blood work is negative CT of the head is negative patient have severe persistent vertigo that is triggered by any minimal have movement associated with mild horizontal nystagmus tried IV fluid Ativan and Antivert 50 mg with absolutely no improvement. Patient will be admitted under the hospitalist service with a neurology consult for further work-up to rule out any other underlying condition       Amount and/or Complexity of Data Reviewed  Clinical lab tests: ordered and reviewed  Tests in the radiology section of CPT®: ordered and reviewed        CONSULTS:  None    PROCEDURES:  Unless otherwise noted below, none     Procedures    FINAL IMPRESSION      1. Vertigo          DISPOSITION/PLAN   DISPOSITION Decision To Admit 02/21/2021 08:01:36 PM      PATIENT REFERRED TO:  No follow-up provider specified.     DISCHARGE MEDICATIONS:  New Prescriptions    No medications on file          (Please note thatportions of this note were completed with a voice recognition program.  Efforts were made to edit the dictations but occasionally words are mis-transcribed.)    Jodi Miranda MD (electronically signed)  Attending Emergency Physician        Angie Willams MD  02/21/21 2014

## 2021-02-22 NOTE — PROGRESS NOTES
Department of Internal Medicine  General Internal Medicine  Attending Progress Note      SUBJECTIVE:  Pt seen and examined. Pt states she feels much better after meclizine and scopolamine patch, but still with significant dizziness when standing. PT to see patient later today    OBJECTIVE      Medications    Current Facility-Administered Medications: promethazine (PHENERGAN) tablet 12.5 mg, 12.5 mg, Oral, Q6H PRN **OR** ondansetron (ZOFRAN) injection 4 mg, 4 mg, Intravenous, Q6H PRN  enoxaparin (LOVENOX) injection 40 mg, 40 mg, Subcutaneous, Daily  aspirin EC tablet 81 mg, 81 mg, Oral, Daily **OR** aspirin suppository 300 mg, 300 mg, Rectal, Daily  labetalol (NORMODYNE;TRANDATE) injection 10 mg, 10 mg, Intravenous, Q10 Min PRN  rosuvastatin (CRESTOR) tablet 40 mg, 40 mg, Oral, Nightly  meclizine (ANTIVERT) tablet 25 mg, 25 mg, Oral, TID  scopolamine (TRANSDERM-SCOP) transdermal patch 1 patch, 1 patch, Transdermal, Q72H  sodium chloride flush 0.9 % injection 10 mL, 10 mL, Intravenous, 2 times per day  sodium chloride flush 0.9 % injection 10 mL, 10 mL, Intravenous, PRN  polyethylene glycol (GLYCOLAX) packet 17 g, 17 g, Oral, Daily PRN  acetaminophen (TYLENOL) tablet 650 mg, 650 mg, Oral, Q6H PRN **OR** acetaminophen (TYLENOL) suppository 650 mg, 650 mg, Rectal, Q6H PRN  0.9 % sodium chloride infusion, , Intravenous, Continuous  melatonin tablet 5 mg, 5 mg, Oral, Nightly PRN  Physical    VITALS:  /66   Pulse 84   Temp 97.3 °F (36.3 °C) (Oral)   Resp 18   Ht 5' 9\" (1.753 m)   Wt 165 lb (74.8 kg)   SpO2 95%   BMI 24.37 kg/m²   Constitutional: Awake and alert in no acute distress.  Lying in bed comfortably  Head: Normocephalic, atraumatic  Eyes: EOMI, PERRLA  ENT: moist mucous membranes  Neck: neck supple, trachea midline  Lungs: Good inspiratory effort, CTABL, no wheeze, no rhonchi, no rales  Heart: RRR, normal S1 and S2  GI: Soft, non-distended, non tender, no guarding, no rebound, +BS  MSK: no edema noted  Skin: warm, dry  Psych: appropriate affect     Data    CBC:   Lab Results   Component Value Date    WBC 7.8 02/22/2021    RBC 4.36 02/22/2021    HGB 13.3 02/22/2021    HCT 39.0 02/22/2021    MCV 89.4 02/22/2021    MCH 30.4 02/22/2021    MCHC 34.0 02/22/2021    RDW 13.7 02/22/2021     02/22/2021     CMP:    Lab Results   Component Value Date     02/22/2021    K 3.7 02/22/2021     02/22/2021    CO2 23 02/22/2021    BUN 9 02/22/2021    CREATININE 0.74 02/22/2021    GFRAA >60.0 02/22/2021    LABGLOM >60.0 02/22/2021    GLUCOSE 91 02/22/2021    PROT 6.1 02/22/2021    LABALBU 3.7 02/22/2021    CALCIUM 8.6 02/22/2021    BILITOT 0.4 02/22/2021    ALKPHOS 40 02/22/2021    AST 13 02/22/2021    ALT 12 02/22/2021       ASSESSMENT AND PLAN      # Vertigo  - likely BPPV  - neurology consutled - meclizine TID and scopolamine patch  - carotid US negative, MRI brain negative  - labs unremarkable, VSS  - PT/OT  - nausea control    Disposition: Pt can be discharged home if able to ambulate without assistance.  If still with unsteadiness with ambulation tomorrow, will discuss possible rehab       Maye Marie DO  Internal Medicine

## 2021-02-23 VITALS
TEMPERATURE: 98.2 F | WEIGHT: 165 LBS | DIASTOLIC BLOOD PRESSURE: 83 MMHG | HEIGHT: 69 IN | OXYGEN SATURATION: 96 % | BODY MASS INDEX: 24.44 KG/M2 | HEART RATE: 69 BPM | RESPIRATION RATE: 18 BRPM | SYSTOLIC BLOOD PRESSURE: 111 MMHG

## 2021-02-23 LAB
ALBUMIN SERPL-MCNC: 3.4 G/DL (ref 3.5–4.6)
ALP BLD-CCNC: 43 U/L (ref 40–130)
ALT SERPL-CCNC: 12 U/L (ref 0–33)
ANION GAP SERPL CALCULATED.3IONS-SCNC: 5 MEQ/L (ref 9–15)
AST SERPL-CCNC: 14 U/L (ref 0–35)
BASOPHILS ABSOLUTE: 0 K/UL (ref 0–0.2)
BASOPHILS RELATIVE PERCENT: 0.8 %
BILIRUB SERPL-MCNC: <0.2 MG/DL (ref 0.2–0.7)
BUN BLDV-MCNC: 15 MG/DL (ref 8–23)
CALCIUM SERPL-MCNC: 8.4 MG/DL (ref 8.5–9.9)
CHLORIDE BLD-SCNC: 111 MEQ/L (ref 95–107)
CHOLESTEROL, TOTAL: 183 MG/DL (ref 0–199)
CK MB: 1.1 NG/ML (ref 0–3.8)
CO2: 24 MEQ/L (ref 20–31)
CREAT SERPL-MCNC: 0.8 MG/DL (ref 0.5–0.9)
CREATINE KINASE-MB INDEX: 2.2 % (ref 0–3.5)
EOSINOPHILS ABSOLUTE: 0.1 K/UL (ref 0–0.7)
EOSINOPHILS RELATIVE PERCENT: 2.5 %
GFR AFRICAN AMERICAN: >60
GFR NON-AFRICAN AMERICAN: >60
GLOBULIN: 2.5 G/DL (ref 2.3–3.5)
GLUCOSE BLD-MCNC: 96 MG/DL (ref 70–99)
HBA1C MFR BLD: 5.5 % (ref 4.8–5.9)
HCT VFR BLD CALC: 39.1 % (ref 37–47)
HDLC SERPL-MCNC: 63 MG/DL (ref 40–59)
HEMOGLOBIN: 13 G/DL (ref 12–16)
LDL CHOLESTEROL CALCULATED: 94 MG/DL (ref 0–129)
LYMPHOCYTES ABSOLUTE: 1.6 K/UL (ref 1–4.8)
LYMPHOCYTES RELATIVE PERCENT: 27.6 %
MCH RBC QN AUTO: 30.2 PG (ref 27–31.3)
MCHC RBC AUTO-ENTMCNC: 33.2 % (ref 33–37)
MCV RBC AUTO: 90.8 FL (ref 82–100)
MONOCYTES ABSOLUTE: 0.4 K/UL (ref 0.2–0.8)
MONOCYTES RELATIVE PERCENT: 7.5 %
NEUTROPHILS ABSOLUTE: 3.5 K/UL (ref 1.4–6.5)
NEUTROPHILS RELATIVE PERCENT: 61.6 %
PDW BLD-RTO: 13.7 % (ref 11.5–14.5)
PLATELET # BLD: 216 K/UL (ref 130–400)
POTASSIUM REFLEX MAGNESIUM: 4.7 MEQ/L (ref 3.4–4.9)
RBC # BLD: 4.31 M/UL (ref 4.2–5.4)
SODIUM BLD-SCNC: 140 MEQ/L (ref 135–144)
TOTAL CK: 50 U/L (ref 0–170)
TOTAL PROTEIN: 5.9 G/DL (ref 6.3–8)
TRIGL SERPL-MCNC: 132 MG/DL (ref 0–150)
TROPONIN: <0.01 NG/ML (ref 0–0.01)
WBC # BLD: 5.6 K/UL (ref 4.8–10.8)

## 2021-02-23 PROCEDURE — 97165 OT EVAL LOW COMPLEX 30 MIN: CPT

## 2021-02-23 PROCEDURE — 84484 ASSAY OF TROPONIN QUANT: CPT

## 2021-02-23 PROCEDURE — G0378 HOSPITAL OBSERVATION PER HR: HCPCS

## 2021-02-23 PROCEDURE — 6370000000 HC RX 637 (ALT 250 FOR IP): Performed by: PSYCHIATRY & NEUROLOGY

## 2021-02-23 PROCEDURE — 97116 GAIT TRAINING THERAPY: CPT

## 2021-02-23 PROCEDURE — 83036 HEMOGLOBIN GLYCOSYLATED A1C: CPT

## 2021-02-23 PROCEDURE — 2580000003 HC RX 258: Performed by: NURSE PRACTITIONER

## 2021-02-23 PROCEDURE — 99226 PR SBSQ OBSERVATION CARE/DAY 35 MINUTES: CPT | Performed by: PSYCHIATRY & NEUROLOGY

## 2021-02-23 PROCEDURE — 80061 LIPID PANEL: CPT

## 2021-02-23 PROCEDURE — 85025 COMPLETE CBC W/AUTO DIFF WBC: CPT

## 2021-02-23 PROCEDURE — 82553 CREATINE MB FRACTION: CPT

## 2021-02-23 PROCEDURE — 80053 COMPREHEN METABOLIC PANEL: CPT

## 2021-02-23 PROCEDURE — 6370000000 HC RX 637 (ALT 250 FOR IP): Performed by: INTERNAL MEDICINE

## 2021-02-23 PROCEDURE — 36415 COLL VENOUS BLD VENIPUNCTURE: CPT

## 2021-02-23 PROCEDURE — 82550 ASSAY OF CK (CPK): CPT

## 2021-02-23 RX ORDER — SCOLOPAMINE TRANSDERMAL SYSTEM 1 MG/1
1 PATCH, EXTENDED RELEASE TRANSDERMAL
Qty: 5 PATCH | Refills: 0 | Status: SHIPPED | OUTPATIENT
Start: 2021-02-23 | End: 2021-03-10

## 2021-02-23 RX ORDER — MECLIZINE HYDROCHLORIDE 25 MG/1
25 TABLET ORAL 3 TIMES DAILY
Qty: 30 TABLET | Refills: 1 | Status: SHIPPED | OUTPATIENT
Start: 2021-02-23 | End: 2021-03-05

## 2021-02-23 RX ADMIN — SODIUM CHLORIDE: 9 INJECTION, SOLUTION INTRAVENOUS at 00:19

## 2021-02-23 RX ADMIN — MECLIZINE HYDROCHLORIDE 25 MG: 25 TABLET ORAL at 10:07

## 2021-02-23 RX ADMIN — ASPIRIN 81 MG: 81 TABLET, COATED ORAL at 10:07

## 2021-02-23 ASSESSMENT — ENCOUNTER SYMPTOMS
COLOR CHANGE: 0
TROUBLE SWALLOWING: 0
SHORTNESS OF BREATH: 0
BACK PAIN: 0
NAUSEA: 0
VOMITING: 0
CHOKING: 0
PHOTOPHOBIA: 0

## 2021-02-23 NOTE — PROGRESS NOTES
Cranial nerve XI         Shoulder shrug:  intact      Cranial nerve XII          Tongue movement:  normal    Motor:    Drift:  absent  Motor exam is symmetrical 5 out of 5 all extremities bilaterally  Tone:  normal  Abnormal Movements:  absent            Sensory:        Pinprick             Right Upper Extremity:  normal             Left Upper Extremity:  normal             Right Lower Extremity:  normal             Left Lower Extremity:  normal           Vibration                         Touch            Proprioception                 Coordination:           Finger/Nose   Right:  normal              Left:  normal          Heel-Knee-Shin                Right:  normal              Left:  normal          Rapid Alternating Movements              Right:  normal              Left:  normal          Gait:                       Casual:  normal                         Romberg:  normal            Reflexes:             Deep Tendon Reflexes:             Reflexes are 2 +             Plantar response:                Right:  downgoing               Left:  downgoing    Vascular:  Cardiac Exam:  normal         Ct Head Wo Contrast    Result Date: 2/22/2021  CT Brain Contrast medium:  Not utilized. History:  Dizziness Comparison:  August 16, 2007 Findings: Extra-axial spaces:  Normal. Intracranial hemorrhage:  None. Ventricular system:  Normal for age. Basal Cisterns:  Normal. Cerebral Parenchyma:  Normal. Midline Shift:  None. Cerebellum:  Normal. Paranasal sinuses and mastoid air cells:  Normal. Visualized Orbits:  Normal.     Impression: Negative CT brain All CT scans at this facility use dose modulation, iterative reconstruction, and/or weight based dosing when appropriate to reduce radiation dose to as low as reasonably achievable.     Mri Brain Wo Contrast    Result Date: 2/22/2021  EXAMINATION: MRI BRAIN WO CONTRAST CLINICAL HISTORY:  vertigo, concern for brainstem stroke COMPARISONS: NONE AVAILABLE TECHNIQUE: Multiplanar multisequence images of the brain were obtained without contrast. Diffusion perfusion imaging was obtained. FINDINGS:  There are no extra-axial collections. There is no evidence of hemorrhage. There are no areas of perfusion diffusion signal abnormality to suggest ischemia. The susceptibility images do not demonstrate a small area of artifact in the left frontal medial subcortical white matter which most likely represents a focus of calcification. There is preservation of the gray-white matter differentiation. There are no areas of signal abnormality within the brain parenchyma or the posterior fossa to suggest lesion. The sulci and ventricles are within normal limits without evidence of hydrocephalus. The midline structures are intact, the corpus callosum is within normal limits. The region of the pineal gland and the sella turcica are unremarkable. There are no space-occupying lesions in the posterior fossa. The basilar cisterns are patent. The craniocervical junction is unremarkable. The visualized portions of the orbits are within normal limits, the globes are intact. The visualized portions of the paranasal sinuses are within normal limits. The calvarium and soft tissues are unremarkable. There are no acute intracranial changes, no evidence of ischemia or hemorrhage. There are no regions of signal abnormality. Us Carotid Artery Bilateral    Result Date: 2/22/2021  Carotid Ultrasound Examination Clinical information:  Vertigo  Findings Grayscale and color Doppler ultrasound examination of the carotid and vertebral artery systems bilaterally. Maximum peak systolic velocity (PSV) / end diastolic velocity (EDV) measurements were obtained. Right Carotid System Right Common Carotid Artery (RCCA): 87  cm/s. Right Internal Carotid Artery (STEPHANIE): 91  cm/s. Right External Carotid Artery (RECA): 80 cm/s. Right Vertebral Artery (RVA): Antegrade flow. Right ICA/CCA ratio: 1.08.  Left

## 2021-02-23 NOTE — DISCHARGE SUMMARY
Physician Discharge Summary     Patient ID:  Jeffry Horowitz  [id number]  65 y.o.  1955    Admit date: 2/21/2021    Discharge date : 02/23/21     Admitting Physician: Jean Henao DO     Discharge Physician: Hyun Witt DO     Admission Diagnoses: Vertigo [R42]    Discharge Diagnoses: Vertigo    Admission Condition: fair    Discharged Condition: good    Hospital Course: 62-year-old female who presents with 24 hours duration of vertigo which started today without any associated head injuries traumas accidents. Patient states that she has a feeling that the room is spinning whenever she stands and is a persistent symptom that does not improve with any medications. Symptoms resolved spontaneously when lying flat. She has no associated chest pains palpitation shortness of breath wheezing nausea vomiting diarrhea constipation. She does admit to a history of vertigo once in the past which was resolved with meclizine    In the emergency department she is afebrile vital signs are stable. Basic metabolic panel and CBC are benign. CT head was negative. Pt admitted and neuro consulted. Pt workup was benign and neuro believed this to be BPPV and started patient on scheduled meclizine and scopolamine patch which greatly improved her symptoms. Pt was able to ambulate without dizziness or unsteadiness on 2/23 and discharged home in stable condition. Pt will follow up with PCP after discharge. Consults: neurology      Discharge Exam:  Constitutional: Awake and alert in no acute distress.  Lying in bed comfortably  Head: Normocephalic, atraumatic  Eyes: EOMI, PERRLA  ENT: moist mucous membranes  Neck: neck supple, trachea midline  Lungs: Good inspiratory effort, CTABL, no wheeze, no rhonchi, no rales  Heart: RRR, normal S1 and S2  GI: Soft, non-distended, non tender, no guarding, no rebound, +BS  MSK: no edema noted  Skin: warm, dry  Psych: appropriate affect    Labs:   Recent Labs     02/21/21  1530 02/22/21  0501 02/23/21  0528   WBC 10.2 7.8 5.6   HGB 15.8 13.3 13.0   HCT 46.9 39.0 39.1    227 216     Recent Labs     02/22/21  0501 02/22/21  0854 02/23/21  0528    140 140   K 3.6 3.7 4.7    108* 111*   CO2 22 23 24   BUN 9 9 15   CREATININE 0.73 0.74 0.80   CALCIUM 8.4* 8.6 8.4*     Recent Labs     02/21/21  1530 02/22/21  0501 02/23/21  0528   AST 22 13 14   ALT 17 12 12   BILITOT 0.4 0.4 <0.2   ALKPHOS 58 40 43     No results for input(s): INR in the last 72 hours. Recent Labs     02/22/21  1149 02/22/21  1809 02/23/21  0000   CKTOTAL 58 68 50   TROPONINI <0.010 <0.010 <0.010       Urinalysis:   No results found for: Eugena Spell, BACTERIA, RBCUA, BLOODU, SPECGRAV, GLUCOSEU    Radiology:   Most recent    Chest CT      WITH CONTRAST:No results found for this or any previous visit. WITHOUT CONTRAST: No results found for this or any previous visit. CXR      2-view:   Results for orders placed in visit on 02/26/18   XR CHEST STANDARD (2 VW)    Narrative Chest 2 views. HISTORY:    Sinus congestion. FINDINGS:    Comparison CT chest, October 24, 2007. Osseous structures intact. Cardiopericardial silhouette is normal. Pulmonary vasculature is normal. Lungs are clear. Impression No acute cardiopulmonary disease. Portable: No results found for this or any previous visit. Echo No results found for this or any previous visit. Disposition: home    In process/preliminary results:  Outstanding Order Results     No orders found from 1/23/2021 to 2/22/2021.           Patient Instructions:   Discharge Medication List as of 2/23/2021 11:35 AM      START taking these medications    Details   aspirin 81 MG EC tablet Take 1 tablet by mouth daily, Disp-30 tablet, R-3Normal      rosuvastatin (CRESTOR) 40 MG tablet Take 1 tablet by mouth nightly, Disp-30 tablet, R-3Normal         CONTINUE these medications which have CHANGED    Details   meclizine (ANTIVERT) 25 MG tablet Take 1 tablet by mouth 3 times daily for 10 days, Disp-30 tablet, R-1Print      scopolamine (TRANSDERM-SCOP) transdermal patch Place 1 patch onto the skin every 72 hours for 15 days, Disp-5 patch, R-0Print         CONTINUE these medications which have NOT CHANGED    Details   estradiol (VIVELLE) 0.1 MG/24HR PLACE 1 PATCH ON THE SKIN TWICE A WEEK (PLEASE CONTACT DOCTOR), Disp-24 patch,R-0Normal      omega-3 acid ethyl esters (LOVAZA) 1 g capsule Take 2 capsules by mouth 2 times daily, Disp-360 capsule, R-3Normal      Magnesium Malate 1250 (141.7 Mg) MG TABS Take 2,500 mg by mouth once Historical Med      Methylsulfonylmethane (MSM) 1000 MG CAPS Take 2,000 mg by mouth once Historical Med      b complex vitamins capsule Take 1 capsule by mouth 2 times dailyHistorical Med      vitamin B-12 (CYANOCOBALAMIN) 500 MCG tablet Take 5,000 mcg by mouth dailyHistorical Med      Red Yeast Rice 600 MG TABS Take 1,200 mg by mouth once Historical Med      Cholecalciferol (VITAMIN D3) 2000 units CAPS Take 5,000 Units by mouth daily Historical Med      Probiotic Product (PROBIOTIC ADVANCED PO) Take 540 mg by mouth dailyHistorical Med      folic acid (FOLVITE) 256 MCG tablet Take 800 mcg by mouth dailyHistorical Med      TURMERIC PO Take 900 mg by mouthHistorical Med      FIBER ADULT GUMMIES PO Take 6 g by mouth onceHistorical Med           Activity: activity as tolerated  Diet: regular diet  Wound Care: none needed    Follow-up with Chris Mix MD  in 1 week.     DC time 35 minutes    Signed:  Electronically signed by Alex Huerta DO on 2/23/2021 at 12:01 PM

## 2021-02-23 NOTE — CARE COORDINATION
Patient requested to speak with this CM regarding the observation status and how she can get the status changed to inpatient. I explained the chart had been reviewed by physician advisors and determined to be observation appropriate. Although not agreeable,  she verbalized understanding.   Electronically signed by Orquidea Hyde RN on 2/23/2021 at 11:51 AM

## 2021-02-23 NOTE — PROGRESS NOTES
[facility] OCCUPATIONAL THERAPY EVALUATION - ACUTE     NAME: Bronwyn Duverney  : [birth year] (45 y.o.)  MRN: [id number]  CODE STATUS: Full Code  Room: E841N409-20    Date of Service: 2021    Patient Diagnosis(es): Vertigo [R42]   Chief Complaint   Patient presents with    Dizziness     n/v     Patient Active Problem List    Diagnosis Date Noted    Benign paroxysmal positional vertigo of right ear     Ataxia     Vertigo 2021    N&V (nausea and vomiting) 2021        Past Medical History:   Diagnosis Date    Hyperlipidemia      Past Surgical History:   Procedure Laterality Date    APPENDECTOMY      COLONOSCOPY N/A 2019    COLORECTAL CANCER SCREENING, NOT HIGH RISK performed by Garry Aase, MD at [facility]        Restrictions  Restrictions/Precautions: Fall Risk(mod miller score)     Safety Devices: Safety Devices  Safety Devices in place: (Patient in room with no bed alarm in place at this time.)        Subjective  Pre Treatment Pain Screening  Pain at present: 0  Scale Used: Numeric Score  Intervention List: Patient able to continue with treatment    Pain Reassessment:   Pain Assessment  Patient Currently in Pain: No  Pain Assessment: 0-10  Pain Level: 0       Prior Level of Function:  Social/Functional History  Lives With: Spouse( is retired)  Type of Home: House  Home Layout: Two level, Bed/Bath upstairs, 1/2 bath on main level  Home Access: Stairs to enter with rails  Entrance Stairs - Number of Steps: 3  Entrance Stairs - Rails: Left  Bathroom Shower/Tub: Walk-in shower  Bathroom Equipment: Shower chair, Grab bars in [address]: (Patient reports they own a lot of equipment at home from when their parents used it.)  ADL Assistance: Independent  Homemaking Assistance: Independent  Homemaking Responsibilities: Yes  Ambulation Assistance: Independent  Transfer Assistance: Independent  Active : Yes  Occupation: Retired  Type of occupation: [employer]  Leisure & Hobbies: works out in home gym. OBJECTIVE:     Orientation Status:  Orientation  Overall Orientation Status: Within Functional Limits    Observation:  Observation/Palpation  Observation: Patient reports dizziness isn't currenlty an issue. Patient supine in bed with HOB raised with no bed alarm at this time. Cognition Status:  Cognition  Overall Cognitive Status: WFL    Perception Status:  Perception  Overall Perceptual Status: WFL    Sensation Status:  Sensation  Overall Sensation Status: WFL(Patient with no complaints of numbness or tingling.)    Vision and Hearing Status:  Vision  Vision: Within Functional Limits  Hearing  Hearing: Within functional limits     ROM:   LUE AROM (degrees)  LUE AROM : WFL  RUE AROM (degrees)  RUE AROM : WFL    Strength:  LUE Strength  L Hand General: 4/5  LUE Strength Comment: Grossly assessed 4/5  RUE Strength  R Hand General: 4/5  RUE Strength Comment: Grossly assessed 4/5    Coordination, Tone, Quality of Movement: Tone RUE  RUE Tone: Normotonic  Tone LUE  LUE Tone: Normotonic  Coordination  Movements Are Fluid And Coordinated: Yes    Hand Dominance:  Hand Dominance  Hand Dominance: Right    ADL Status:  ADL  Feeding: Independent  Grooming: Independent  UE Bathing: Independent  LE Bathing: Independent  UE Dressing: Independent  LE Dressing: Independent  Toileting: Independent  Additional Comments: All ADLs simulated as stated above. Patient did walk to bathroom with no assistance and completed toilet transfer independently. Patient with no LOB or complaints of dizziness. Toilet Transfers  Toilet Transfer: Independent  Toilet Transfers Comments: Patient completed toilet transfer with no assistance.        Therapy key for assistance levels -   Independent = Pt. is able to perform task with no assistance but may require a device   Stand by assistance = Pt. does not perform task at an independent level but does not need physical assistance, requires verbal cues  Minimal, Moderate, Maximal Assistance = Pt. requires physical assistance (25%, 50%, 75% assist from helper) for task but is able to actively participate in task   Dependent = Pt. requires total assistance with task and is not able to actively participate with task completion     Functional Mobility:  Functional Mobility  Activity: To/from bathroom  Assist Level: Independent  Functional Mobility Comments: patient completed functional mobility to and from bathroom with no LOB or complaints of dizziness. Bed Mobility  Bed mobility  Supine to Sit: Independent  Sit to Supine: Independent    Seated and Standing Balance:  Balance  Sitting Balance: Independent  Standing Balance: Independent    Functional Endurance:  Activity Tolerance  Activity Tolerance: Patient Tolerated treatment well    D/C Recommendations:  OT D/C RECOMMENDATIONS  REQUIRES OT FOLLOW UP: No    Equipment Recommendations:       OT Education:   OT Education  OT Education: OT Role    OT Follow Up:  OT D/C RECOMMENDATIONS  REQUIRES OT FOLLOW UP: No       Assessment/Discharge Disposition:  Assessment: Patient is a 77 y/o female who presents [facility] with complaints of dizziness and vertigo. Patient reports she has had this happen in the past. Patient presents from home with her spouse and was independent in all ADL and IADLs prior to admission. Patient states the dizziness has been resolved. Patient completed functional mobility to and from toilet with no LOB or complaints of dizziness. Patient with no acute OT needs at this time. Prognosis: Good  No Skilled OT: Independent with functional mobility, Independent with ADL's, No OT goals identified  Decision Making: Low Complexity  History: Patient has low complexity history. Exam: 0 performance deficits at this time.   Assistance / Modification: Independent    Six Click Score   How much help for putting on and taking off regular lower body clothing?: None  How much help for Bathing?: None  How much help for Toileting?: None  How much help for putting on and taking off regular upper body clothing?: None  How much help for taking care of personal grooming?: None  How much help for eating meals?: None  AM-[facility] Inpatient Daily Activity Raw Score: 24  AM-[facility] Inpatient ADL T-Scale Score : 57.54  ADL Inpatient CMS 0-100% Score: 0    Plan:  Plan  Plan Comment: No acute OT needs at this time. Goals:   No acute OT needs at this time. No acute OT goals, patient not on program at this time. Patient Goal: Patient goals :  To go home     Discussed and agreed upon: Yes Comments:     Therapy Time:   OT Individual Minutes  Time In: 1119  Time Out: 1129  Minutes: 10    Eval: 10 minutes     Electronically signed by:    HELADIO Moore  2/23/2021, 11:48 AM

## 2021-02-23 NOTE — PROGRESS NOTES
[address]                      [address], [address] [facility]                                 PROGRESS NOTE    PATIENT NAME: Meka Mccauley                     :        1955  MED REC NO:   [id number]                            ROOM:       W288  ACCOUNT NO:   [de-identified]                           ADMIT DATE: 2021  PROVIDER:     Romeo Cody MD      OTOLARYNGOLOGY FOLLOWUP    The patient states that dizziness is much improved and resolved. She is  able to walk without having any dizzy symptoms. The patient continues  to have some ringing in the ears. The patient's labs were reviewed and  x-ray was reviewed. The patient has dizziness consistent with benign  process. The patient does have history of persistent ringing and  fullness. She would have evaluation in the office for this problem. I discussed with her a followup in the office.       Shelby Apple MD    D: 2021 11:43:13       T: 2021 12:31:46     [email or web address]  Job#: [id number]     Doc#: [id number]    CC:  Romeo Cody MD

## 2021-02-23 NOTE — PROGRESS NOTES
Physical Therapy Med Surg Daily Treatment Note  Facility/Department: [facility]  Room: [place] [address]       NAME: Yumiko Hung  : 3/19/1948 (82 y.o.)  MRN: [id number]  CODE STATUS: Full Code    Date of Service: 2021    Patient Diagnosis(es): Vertigo [R42]   Chief Complaint   Patient presents with    Dizziness     n/v     Patient Active Problem List    Diagnosis Date Noted    Benign paroxysmal positional vertigo of right ear     Ataxia     Vertigo 2021    N&V (nausea and vomiting) 2021        Past Medical History:   Diagnosis Date    Hyperlipidemia      Past Surgical History:   Procedure Laterality Date    APPENDECTOMY      COLONOSCOPY N/A 2019    COLORECTAL CANCER SCREENING, NOT HIGH RISK performed by Cici Priest MD at [facility]       Restrictions  Restrictions/Precautions: Fall Risk(mod miller score)    SUBJECTIVE   General  Chart Reviewed: Yes  Family / Caregiver Present: No  Subjective  Subjective: \"I'm doing pretty good. \"    Pre-Session Pain Report  Pre Treatment Pain Screening  Pain at present: 0  Scale Used: Numeric Score  Intervention List: Patient able to continue with treatment  Pain Screening  Patient Currently in Pain: No       Post-Session Pain Report            OBJECTIVE        Bed mobility  Supine to Sit: Independent  Sit to Supine: (DNT pt into restroom to get cleaned up after tx.)    Transfers  Sit to Stand: Independent  Stand to sit: Independent    Ambulation  Ambulation?: Yes  Ambulation 1  Device: (pt requests to use IV pole but trialed without and pt has similar gait with and without IV pole.)  Assistance: Modified Independent  Quality of Gait: no major deviations, safe/steady. Distance: 300'  Comments: no c/o dizziness this date.     Stairs/Curb  Stairs?: No(pt report she will have no difficulty completing at home.)                                    Activity Tolerance  Activity Tolerance: Patient Tolerated treatment well          ASSESSMENT   Assessment: pt without c/o dizziness independent level for mobility. Discharge Recommendations:  Continue to assess pending progress, Patient would benefit from continued therapy after discharge    Goals  Long term goals  Long term goal 1: Pt will demonstrate improved balance evidenced by DGI 22/24 indicating safe ambulator. Long term goal 2: Pt will demonstrate stair negotiation 3 steps indep to allow pt to safely enter and exit her home. Long term goal 3: Pt will demonstrate amb > or = 200 ft with decreased dizziness indep to allow pt to safely amb inside her home at [employer]. PLAN    Times per week: 3-6  Safety Devices  Type of devices: Left in chair, Nurse notified(pt left in restroom in chair at the sink PCA notified and she states this is okay.)     [facility] (6 CLICK) Melissa 95 Raw Score : 23      Therapy Time   Individual   Time In 0938   Time Out 0952   Minutes 14      Gait: [address], [place], 02/23/21 at 9:59 AM         Definitions for assistance levels  Independent = pt does not require any physical supervision or assistance from another person for activity completion. Device may be needed.   Stand by assistance = pt requires verbal cues or instructions from another person, close to but not touching, to perform the activity  Minimal assistance= pt performs 75% or more of the activity; assistance is required to complete the activity  Moderate assistance= pt performs 50% of the activity; assistance is required to complete the activity  Maximal assistance = pt performs 25% of the activity; assistance is required to complete the activity  Dependent = pt requires total physical assistance to accomplish the task

## 2021-02-23 NOTE — PROGRESS NOTES
Patient ambulated to the restroom and was dizzy. Stand by assistance to the restroom. No pain or double vision. No nausea or headache. Patient requested Melatonin for sleep and a snack was provided. Call light with the patient.

## 2021-03-17 DIAGNOSIS — N95.1 MENOPAUSAL SYMPTOMS: ICD-10-CM

## 2021-03-18 ENCOUNTER — TELEPHONE (OUTPATIENT)
Dept: FAMILY MEDICINE CLINIC | Age: 66
End: 2021-03-18

## 2021-03-18 RX ORDER — ESTRADIOL 0.1 MG/D
FILM, EXTENDED RELEASE TRANSDERMAL
Qty: 24 PATCH | Refills: 0 | Status: SHIPPED | OUTPATIENT
Start: 2021-03-18 | End: 2021-08-05

## 2021-03-19 NOTE — TELEPHONE ENCOUNTER
Luana Gonzales MD    The Vivelle-Dot patch should be replaced every 3-4 days. Thank you,  Halle Richardson, PharmD, [address]   Direct: [phone]  Department, toll free: [phone], option 7    CLINICAL PHARMACY CONSULT: MED RECONCILIATION/REVIEW ADDENDUM  For Pharmacy Admin Tracking Only  PHSO: Yes  Total # of Interventions Recommended: 1  - Updated Order #: 1 Updated Order Reason(s):  Other  Recommended intervention potential cost savings: 1  Total Interventions Accepted: 1  Time Spent (min): 30

## 2021-06-19 DIAGNOSIS — E78.1 HYPERTRIGLYCERIDEMIA: ICD-10-CM

## 2021-06-20 RX ORDER — OMEGA-3-ACID ETHYL ESTERS 1 G/1
2 CAPSULE, LIQUID FILLED ORAL 2 TIMES DAILY
Qty: 360 CAPSULE | Refills: 3 | Status: SHIPPED | OUTPATIENT
Start: 2021-06-20 | End: 2022-08-01 | Stop reason: SDUPTHER

## 2021-07-02 ENCOUNTER — TELEPHONE (OUTPATIENT)
Dept: FAMILY MEDICINE CLINIC | Age: 66
End: 2021-07-02

## 2021-08-04 DIAGNOSIS — N95.1 MENOPAUSAL SYMPTOMS: ICD-10-CM

## 2021-08-05 RX ORDER — ESTRADIOL 0.1 MG/D
FILM, EXTENDED RELEASE TRANSDERMAL
Qty: 8 PATCH | Refills: 1 | Status: SHIPPED | OUTPATIENT
Start: 2021-08-05 | End: 2021-08-13 | Stop reason: SDUPTHER

## 2021-08-13 DIAGNOSIS — N95.1 MENOPAUSAL SYMPTOMS: ICD-10-CM

## 2021-08-16 RX ORDER — ESTRADIOL 0.1 MG/D
FILM, EXTENDED RELEASE TRANSDERMAL
Qty: 8 PATCH | Refills: 1 | Status: SHIPPED | OUTPATIENT
Start: 2021-08-16 | End: 2021-08-25 | Stop reason: SDUPTHER

## 2021-08-23 ENCOUNTER — TELEPHONE (OUTPATIENT)
Dept: FAMILY MEDICINE CLINIC | Age: 66
End: 2021-08-23

## 2021-08-25 DIAGNOSIS — N95.1 MENOPAUSAL SYMPTOMS: ICD-10-CM

## 2021-08-26 RX ORDER — ESTRADIOL 0.1 MG/D
FILM, EXTENDED RELEASE TRANSDERMAL
Qty: 8 PATCH | Refills: 1 | Status: SHIPPED | OUTPATIENT
Start: 2021-08-26 | End: 2021-10-11

## 2021-09-16 ENCOUNTER — HOSPITAL ENCOUNTER (OUTPATIENT)
Dept: WOMENS IMAGING | Age: 66
Discharge: HOME | End: 2021-09-18
Payer: MEDICARE

## 2021-09-16 VITALS — HEIGHT: 69 IN | BODY MASS INDEX: 24.37 KG/M2

## 2021-09-16 DIAGNOSIS — Z12.31 ENCOUNTER FOR SCREENING MAMMOGRAM FOR MALIGNANT NEOPLASM OF BREAST: ICD-10-CM

## 2021-09-16 PROCEDURE — 77063 BREAST TOMOSYNTHESIS BI: CPT

## 2021-10-10 DIAGNOSIS — N95.1 MENOPAUSAL SYMPTOMS: ICD-10-CM

## 2021-10-11 RX ORDER — ESTRADIOL 0.1 MG/D
FILM, EXTENDED RELEASE TRANSDERMAL
Qty: 12 PATCH | Refills: 1 | Status: SHIPPED | OUTPATIENT
Start: 2021-10-11 | End: 2022-06-02 | Stop reason: SDUPTHER

## 2021-10-11 NOTE — TELEPHONE ENCOUNTER
Future Appointments    This patient does not currently have any appointments scheduled.   Recent Visits    11/02/2020 Post-menopausal   [facility] AT [facility] and Heath Hua MD

## 2021-12-14 ENCOUNTER — NURSE TRIAGE (OUTPATIENT)
Dept: OTHER | Facility: CLINIC | Age: 66
End: 2021-12-14

## 2021-12-14 ENCOUNTER — OFFICE VISIT (OUTPATIENT)
Dept: FAMILY MEDICINE CLINIC | Age: 66
End: 2021-12-14
Payer: MEDICARE

## 2021-12-14 VITALS
SYSTOLIC BLOOD PRESSURE: 130 MMHG | HEIGHT: 69 IN | WEIGHT: 165 LBS | TEMPERATURE: 94.9 F | HEART RATE: 95 BPM | BODY MASS INDEX: 24.44 KG/M2 | OXYGEN SATURATION: 96 % | DIASTOLIC BLOOD PRESSURE: 82 MMHG

## 2021-12-14 DIAGNOSIS — J06.9 URI WITH COUGH AND CONGESTION: Primary | ICD-10-CM

## 2021-12-14 LAB
Lab: NORMAL
PERFORMING INSTRUMENT: NORMAL
QC PASS/FAIL: NORMAL
SARS-COV-2, POC: NORMAL

## 2021-12-14 PROCEDURE — 99213 OFFICE O/P EST LOW 20 MIN: CPT | Performed by: NURSE PRACTITIONER

## 2021-12-14 PROCEDURE — 3288F FALL RISK ASSESSMENT DOCD: CPT | Performed by: NURSE PRACTITIONER

## 2021-12-14 PROCEDURE — 87426 SARSCOV CORONAVIRUS AG IA: CPT | Performed by: NURSE PRACTITIONER

## 2021-12-14 RX ORDER — BROMPHENIRAMINE MALEATE, PSEUDOEPHEDRINE HYDROCHLORIDE, AND DEXTROMETHORPHAN HYDROBROMIDE 2; 30; 10 MG/5ML; MG/5ML; MG/5ML
5 SYRUP ORAL 4 TIMES DAILY PRN
Qty: 180 ML | Refills: 0 | Status: SHIPPED | OUTPATIENT
Start: 2021-12-14

## 2021-12-14 RX ORDER — DOXYCYCLINE HYCLATE 100 MG
100 TABLET ORAL 2 TIMES DAILY
Qty: 14 TABLET | Refills: 0 | Status: SHIPPED | OUTPATIENT
Start: 2021-12-14 | End: 2021-12-21

## 2021-12-14 SDOH — ECONOMIC STABILITY: FOOD INSECURITY: WITHIN THE PAST 12 MONTHS, YOU WORRIED THAT YOUR FOOD WOULD RUN OUT BEFORE YOU GOT MONEY TO BUY MORE.: NEVER TRUE

## 2021-12-14 SDOH — ECONOMIC STABILITY: FOOD INSECURITY: WITHIN THE PAST 12 MONTHS, THE FOOD YOU BOUGHT JUST DIDN'T LAST AND YOU DIDN'T HAVE MONEY TO GET MORE.: NEVER TRUE

## 2021-12-14 ASSESSMENT — ENCOUNTER SYMPTOMS
SWOLLEN GLANDS: 0
TROUBLE SWALLOWING: 0
CONSTIPATION: 0
ABDOMINAL PAIN: 0
CHEST TIGHTNESS: 0
BACK PAIN: 0
HOARSE VOICE: 0
NAUSEA: 0
COUGH: 1
DIARRHEA: 0
RHINORRHEA: 0
WHEEZING: 0
COLOR CHANGE: 0
SHORTNESS OF BREATH: 0
SINUS COMPLAINT: 1
SINUS PRESSURE: 1
SORE THROAT: 1
SINUS PAIN: 0
ABDOMINAL DISTENTION: 0
VOMITING: 0

## 2021-12-14 ASSESSMENT — SOCIAL DETERMINANTS OF HEALTH (SDOH): HOW HARD IS IT FOR YOU TO PAY FOR THE VERY BASICS LIKE FOOD, HOUSING, MEDICAL CARE, AND HEATING?: NOT HARD AT ALL

## 2021-12-14 ASSESSMENT — PATIENT HEALTH QUESTIONNAIRE - PHQ9
1. LITTLE INTEREST OR PLEASURE IN DOING THINGS: 0
SUM OF ALL RESPONSES TO PHQ QUESTIONS 1-9: 0
SUM OF ALL RESPONSES TO PHQ9 QUESTIONS 1 & 2: 0
SUM OF ALL RESPONSES TO PHQ QUESTIONS 1-9: 0
2. FEELING DOWN, DEPRESSED OR HOPELESS: 0
SUM OF ALL RESPONSES TO PHQ QUESTIONS 1-9: 0

## 2021-12-14 NOTE — PROGRESS NOTES
Subjective  Susie Pascual, 78 y.o. female presents today with:  Chief Complaint   Patient presents with    Sinus Problem     Patient is here c/o sinus problem. Pt states she has a cough, low grade fever, congestion, and fatigue. Pt states symptoms have been ongoing since Friday. Pt states she has used OTC cough medication with minimal relief. Sinus Problem  This is a new problem. The current episode started in the past 7 days. The problem has been gradually worsening since onset. There has been no fever. The pain is mild. Associated symptoms include chills, congestion, coughing, diaphoresis, ear pain, sinus pressure and a sore throat. Pertinent negatives include no headaches, hoarse voice, neck pain, shortness of breath, sneezing or swollen glands. Treatments tried: nyquil. The treatment provided mild relief. Review of Systems   Constitutional: Positive for chills, diaphoresis and fatigue. Negative for activity change, appetite change and fever. HENT: Positive for congestion, ear pain, sinus pressure and sore throat. Negative for hoarse voice, postnasal drip, rhinorrhea, sinus pain, sneezing and trouble swallowing. Eyes: Negative for visual disturbance. Respiratory: Positive for cough. Negative for chest tightness, shortness of breath and wheezing. Cardiovascular: Negative for chest pain and palpitations. Gastrointestinal: Negative for abdominal distention, abdominal pain, constipation, diarrhea, nausea and vomiting. Genitourinary: Negative for difficulty urinating, dysuria, flank pain, frequency and urgency. Musculoskeletal: Negative for arthralgias, back pain, myalgias, neck pain and neck stiffness. Skin: Negative for color change and rash. Neurological: Negative for dizziness, tremors, seizures, syncope, speech difficulty, weakness, light-headedness, numbness and headaches.        Past Medical History:   Diagnosis Date    Hyperlipidemia      Past Surgical History:   Procedure Laterality Date    APPENDECTOMY  1959    BREAST BIOPSY      COLONOSCOPY N/A 9/16/2019    COLORECTAL CANCER SCREENING, NOT HIGH RISK performed by Tez Casas MD at [address] [place] [address] St History     Socioeconomic History    Marital status:      Spouse name: Not on file    Number of children: Not on file    Years of education: Not on file    Highest education level: Not on file   Occupational History    Not on file   Tobacco Use    Smoking status: Never Smoker    Smokeless tobacco: Never Used   Vaping Use    Vaping Use: Not on file   Substance and Sexual Activity    Alcohol use: Not on file     Comment: socially     Drug use: Never    Sexual activity: Yes     Comment:     Other Topics Concern    Not on file   Social History Narrative    Not on file     Social Determinants of Health     Financial Resource Strain: Low Risk     Difficulty of Paying Living Expenses: Not hard at all   Food Insecurity: No Food Insecurity    Worried About Running Out of Food in the Last Year: Never true    [address] in the Last Year: Never true   Transportation Needs:     Lack of Transportation (Medical): Not on file    Lack of Transportation (Non-Medical):  Not on file   Physical Activity:     Days of Exercise per Week: Not on file    Minutes of Exercise per Session: Not on file   Stress:     Feeling of Stress : Not on file   Social Connections:     Frequency of Communication with Friends and Family: Not on file    Frequency of Social Gatherings with Friends and Family: Not on file    Attends Congregational Services: Not on file    Active Member of Clubs or Organizations: Not on file    Attends Club or Organization Meetings: Not on file    Marital Status: Not on file   Intimate Partner Violence:     Fear of Current or Ex-Partner: Not on file    Emotionally Abused: Not on file    Physically Abused: Not on file    Sexually Abused: Not on file Family Hx, and Social Hx reviewed and updated. Health Maintenance reviewed. Objective    Vitals:    12/14/21 1242   BP: 130/82   Site: Left Upper Arm   Position: Sitting   Cuff Size: Small Adult   Pulse: 95   Temp: 94.9 °F (34.9 °C)   SpO2: 96%   Weight: 165 lb (74.8 kg)   Height: 5' 9\" (1.753 m)       Physical Exam  Constitutional:       General: She is not in acute distress. Appearance: Normal appearance. She is normal weight. She is not ill-appearing, toxic-appearing or diaphoretic. HENT:      Head: Normocephalic and atraumatic. Right Ear: Hearing, ear canal and external ear normal. A middle ear effusion is present. Tympanic membrane is bulging. Tympanic membrane is not erythematous. Left Ear: Hearing, ear canal and external ear normal. A middle ear effusion is present. Tympanic membrane is bulging. Tympanic membrane is not erythematous. Nose: Mucosal edema and congestion present. No rhinorrhea. Mouth/Throat:      Lips: Pink. Mouth: Mucous membranes are moist.      Pharynx: Oropharynx is clear. Posterior oropharyngeal erythema present. No pharyngeal swelling, oropharyngeal exudate or uvula swelling. Eyes:      General:         Right eye: No discharge. Left eye: No discharge. Conjunctiva/sclera: Conjunctivae normal.      Pupils: Pupils are equal, round, and reactive to light. Cardiovascular:      Rate and Rhythm: Normal rate and regular rhythm. Pulses: Normal pulses. Pulmonary:      Effort: Pulmonary effort is normal.      Breath sounds: Normal breath sounds. Abdominal:      General: There is no distension. Palpations: Abdomen is soft. Tenderness: There is no abdominal tenderness. Musculoskeletal:         General: No tenderness or signs of injury. Normal range of motion. Cervical back: Normal range of motion and neck supple. No rigidity or tenderness. Skin:     General: Skin is warm and dry.       Capillary Refill: Capillary refill takes less than 2 seconds. Neurological:      General: No focal deficit present. Mental Status: She is alert and oriented to person, place, and time. Mental status is at baseline. Cranial Nerves: No cranial nerve deficit. Sensory: No sensory deficit. Motor: No weakness. Coordination: Coordination normal.         Assessment & Plan    Diagnosis Orders   1. URI with cough and congestion  POCT COVID-19, Antigen    doxycycline hyclate (VIBRA-TABS) 100 MG tablet    brompheniramine-pseudoephedrine-DM 2-30-10 MG/5ML syrup     Orders Placed This Encounter   Procedures    POCT COVID-19, Antigen     Order Specific Question:   Is this test for diagnosis or screening? Answer:   Diagnosis of ill patient     Order Specific Question:   Symptomatic for COVID-19 as defined by CDC? Answer:   Yes     Order Specific Question:   Date of Symptom Onset     Answer:   12/10/2021     Order Specific Question:   Hospitalized for COVID-19? Answer:   No     Order Specific Question:   Admitted to ICU for COVID-19? Answer:   No     Order Specific Question:   Employed in healthcare setting? Answer:   No     Order Specific Question:   Resident in a congregate (group) care setting? Answer:   No     Order Specific Question:   Pregnant? Answer:   No     Order Specific Question:   Previously tested for COVID-19? Answer:   Yes     Orders Placed This Encounter   Medications    doxycycline hyclate (VIBRA-TABS) 100 MG tablet     Sig: Take 1 tablet by mouth 2 times daily for 7 days     Dispense:  14 tablet     Refill:  0    brompheniramine-pseudoephedrine-DM 2-30-10 MG/5ML syrup     Sig: Take 5 mLs by mouth 4 times daily as needed for Congestion or Cough     Dispense:  180 mL     Refill:  0     There are no discontinued medications. Return in about 1 week (around 12/21/2021), or if symptoms worsen or fail to improve.       Reviewed with the patient: current clinical status,medications, activities and diet. Side effects, adverse effects of themedication prescribed today, as well as treatment plan/ rationale and result expectations have been discussed with the patient who expresses understanding and desires to proceed. Close follow up to evaluate treatment results and for coordination of care. I have reviewed the patient's medical history in detail and updated the computerized patient record.      Lola Grimes, ELIZABETH - CNP

## 2021-12-14 NOTE — PATIENT INSTRUCTIONS

## 2021-12-14 NOTE — TELEPHONE ENCOUNTER
Reason for Disposition   Continuous (nonstop) coughing interferes with work or school and no improvement using cough treatment per Care Advice    Answer Assessment - Initial Assessment Questions  1. ONSET: \"When did the cough begin? \"       Friday    2. SEVERITY: \"How bad is the cough today? \"       Moderate    3. RESPIRATORY DISTRESS: \"Describe your breathing. \"       Denies    4. FEVER: \"Do you have a fever? \" If so, ask: \"What is your temperature, how was it measured, and when did it start? \"      Subjective fever    5. HEMOPTYSIS: \"Are you coughing up any blood? \" If so ask: \"How much? \" (flecks, streaks, tablespoons, etc.)     Denies    6. TREATMENT: \"What have you done so far to treat the cough? \" (e.g., meds, fluids, humidifier)    Increase intake of fluids    7. CARDIAC HISTORY: \"Do you have any history of heart disease? \" (e.g., heart attack, congestive heart failure)       Denies    8. LUNG HISTORY: \"Do you have any history of lung disease? \"  (e.g., pulmonary embolus, asthma, emphysema)    Denies    9. PE RISK FACTORS: \"Do you have a history of blood clots? \" (or: recent major surgery, recent prolonged travel, bedridden)  Denies    10. OTHER SYMPTOMS: \"Do you have any other symptoms? (e.g., runny nose, wheezing, chest pain)        Runny nose, \"L ear feels plugged. \"    11. PREGNANCY: \"Is there any chance you are pregnant? \" \"When was your last menstrual period? \"    N/A    12. TRAVEL: \"Have you traveled out of the country in the last month? \" (e.g., travel history, exposures)       N/A    Protocols used: COUGH-ADULT-[place]    Received call from [address] at [facility] with Red Flag Complaint. Brief description of triage: see above    Triage indicates for patient to be seen in office today or tomorrow. Care advice provided, patient verbalizes understanding; denies any other questions or concerns; instructed to call back for any new or worsening symptoms.     Message sent to scheduling team to schedule an appointment for today or tomorrow. Attention Provider: Thank you for allowing me to participate in the care of your patient. The patient was connected to triage in response to information provided to the ECC/PSC. Please do not respond through this encounter as the response is not directed to a shared pool.

## 2021-12-28 ENCOUNTER — TELEPHONE (OUTPATIENT)
Dept: FAMILY MEDICINE CLINIC | Age: 66
End: 2021-12-28

## 2021-12-28 DIAGNOSIS — N89.9 SWELLING OF VAGINA: Primary | ICD-10-CM

## 2021-12-29 NOTE — TELEPHONE ENCOUNTER
Make sure she is aware  She can also seek same day care  If patient cannot be contacted after 2 phone calls and voicemail messages, please mail letter requesting they contact our office.

## 2022-01-13 ENCOUNTER — OFFICE VISIT (OUTPATIENT)
Dept: OBGYN CLINIC | Age: 67
End: 2022-01-13
Payer: MEDICARE

## 2022-01-13 VITALS
HEART RATE: 83 BPM | WEIGHT: 170 LBS | SYSTOLIC BLOOD PRESSURE: 124 MMHG | BODY MASS INDEX: 25.18 KG/M2 | DIASTOLIC BLOOD PRESSURE: 72 MMHG | HEIGHT: 69 IN

## 2022-01-13 DIAGNOSIS — N89.8 HYMENAL REMNANT: Primary | ICD-10-CM

## 2022-01-13 PROCEDURE — 99203 OFFICE O/P NEW LOW 30 MIN: CPT | Performed by: OBSTETRICS & GYNECOLOGY

## 2022-01-15 ASSESSMENT — ENCOUNTER SYMPTOMS
APNEA: 0
SHORTNESS OF BREATH: 0
ABDOMINAL PAIN: 0

## 2022-01-15 NOTE — PROGRESS NOTES
Subjective:      Patient ID:  Payal Hazel is a 78 y.o. female with chief complaint of:  Chief Complaint   Patient presents with    Swelling     new pt has swelling in the inside of her vagina, she also has some swelling on the same side of her neck        Patient presents with concerns bc she feels something vaginally while bathing.  No bleeding no itching no pain      Past Medical History:   Diagnosis Date    Hyperlipidemia      Past Surgical History:   Procedure Laterality Date    APPENDECTOMY  1959    BREAST BIOPSY      COLONOSCOPY N/A 9/16/2019    COLORECTAL CANCER SCREENING, NOT HIGH RISK performed by Norman Gerard MD at [facility]     Family History   Problem Relation Age of Onset    Osteoarthritis Mother     Migraines Mother     Heart Failure Father     Osteoarthritis Father     Heart Disease Father      Current Outpatient Medications on File Prior to Visit   Medication Sig Dispense Refill    estradiol (VIVELLE) 0.1 MG/24HR APPLY 1 PATCH ON THE SKIN TWICE A WEEK  needs appointment for further refills 12 patch 1    omega-3 acid ethyl esters (LOVAZA) 1 g capsule Take 2 capsules by mouth 2 times daily 360 capsule 3    Magnesium Malate 1250 (141.7 Mg) MG TABS Take 2,500 mg by mouth once       Methylsulfonylmethane (MSM) 1000 MG CAPS Take 2,000 mg by mouth once       b complex vitamins capsule Take 1 capsule by mouth 2 times daily      vitamin B-12 (CYANOCOBALAMIN) 500 MCG tablet Take 5,000 mcg by mouth daily      Red Yeast Rice 600 MG TABS Take 1,200 mg by mouth once       Cholecalciferol (VITAMIN D3) 2000 units CAPS Take 5,000 Units by mouth daily       Probiotic Product (PROBIOTIC ADVANCED PO) Take 540 mg by mouth daily      folic acid (FOLVITE) 752 MCG tablet Take 800 mcg by mouth daily      TURMERIC PO Take 900 mg by mouth      FIBER ADULT GUMMIES PO Take 6 g by mouth once      brompheniramine-pseudoephedrine-DM 2-30-10 MG/5ML syrup Take 5 mLs by mouth 4 times daily as needed for Congestion or Cough 180 mL 0    rosuvastatin (CRESTOR) 40 MG tablet Take 1 tablet by mouth nightly (Patient not taking: Reported on 1/13/2022) 30 tablet 3     No current facility-administered medications on file prior to visit. Allergies:  Peppermint oil    Review of Systems   Constitutional: Negative for fatigue and fever. Respiratory: Negative for apnea and shortness of breath. Cardiovascular: Negative for chest pain and palpitations. Gastrointestinal: Negative for abdominal pain. Genitourinary: Negative for difficulty urinating, dyspareunia, dysuria, pelvic pain, vaginal bleeding and vaginal discharge. Neurological: Negative for dizziness, weakness and light-headedness. Psychiatric/Behavioral: Negative for agitation and dysphoric mood. Objective:   /72   Pulse 83   Ht 5' 9\" (1.753 m)   Wt 170 lb (77.1 kg)   BMI 25.10 kg/m²      Physical Exam  Constitutional:       Appearance: Normal appearance. She is well-developed. Eyes:      Pupils: Pupils are equal, round, and reactive to light. Genitourinary:     Labia:         Right: No rash, tenderness or lesion. Left: No rash, tenderness or lesion. Vagina: No vaginal discharge, erythema, tenderness, bleeding or prolapsed vaginal walls. Neurological:      Mental Status: She is alert and oriented to person, place, and time. Psychiatric:         Mood and Affect: Mood normal.         Behavior: Behavior normal.         Assessment:       Diagnosis Orders   1. Hymenal remnant           Plan:        No concerns at this time    No orders of the defined types were placed in this encounter. No orders of the defined types were placed in this encounter. No follow-ups on file.      Virgie Evangelista,

## 2022-02-02 ENCOUNTER — TELEPHONE (OUTPATIENT)
Dept: FAMILY MEDICINE CLINIC | Age: 67
End: 2022-02-02

## 2022-03-13 DIAGNOSIS — N95.1 MENOPAUSAL SYMPTOMS: ICD-10-CM

## 2022-03-13 RX ORDER — ESTRADIOL 0.1 MG/D
FILM, EXTENDED RELEASE TRANSDERMAL
Qty: 8 PATCH | Refills: 12 | OUTPATIENT
Start: 2022-03-13

## 2022-05-17 DIAGNOSIS — N95.1 MENOPAUSAL SYMPTOMS: ICD-10-CM

## 2022-05-17 RX ORDER — ESTRADIOL 0.1 MG/D
FILM, EXTENDED RELEASE TRANSDERMAL
Qty: 12 PATCH | Refills: 1 | Status: CANCELLED | OUTPATIENT
Start: 2022-05-17

## 2022-05-30 ENCOUNTER — PATIENT MESSAGE (OUTPATIENT)
Dept: FAMILY MEDICINE CLINIC | Age: 67
End: 2022-05-30

## 2022-06-02 DIAGNOSIS — N95.1 MENOPAUSAL SYMPTOMS: ICD-10-CM

## 2022-06-02 RX ORDER — ESTRADIOL 0.1 MG/D
FILM, EXTENDED RELEASE TRANSDERMAL
Qty: 12 PATCH | Refills: 1 | Status: SHIPPED | OUTPATIENT
Start: 2022-06-02 | End: 2022-08-01 | Stop reason: SDUPTHER

## 2022-06-03 NOTE — TELEPHONE ENCOUNTER
It never made it to me on 5/17/2022 to be sent  Sent yesterday to [facility]   If a pa is needed or if she is going to run out she can call me over weekend to send a patch or 2 locally   Let her know

## 2022-06-03 NOTE — TELEPHONE ENCOUNTER
This was pended on 05.17.22 and again on 06.02.22 I even spoke with pt yesterday to verify pharm was [facility]

## 2022-06-14 DIAGNOSIS — E78.1 HYPERTRIGLYCERIDEMIA: ICD-10-CM

## 2022-06-14 RX ORDER — OMEGA-3-ACID ETHYL ESTERS 1 G/1
CAPSULE, LIQUID FILLED ORAL
Qty: 360 CAPSULE | Refills: 3 | OUTPATIENT
Start: 2022-06-14

## 2022-08-01 ENCOUNTER — TELEPHONE (OUTPATIENT)
Dept: FAMILY MEDICINE CLINIC | Age: 67
End: 2022-08-01

## 2022-08-01 ENCOUNTER — OFFICE VISIT (OUTPATIENT)
Dept: FAMILY MEDICINE CLINIC | Age: 67
End: 2022-08-01
Payer: MEDICARE

## 2022-08-01 VITALS
DIASTOLIC BLOOD PRESSURE: 74 MMHG | HEART RATE: 71 BPM | WEIGHT: 170 LBS | BODY MASS INDEX: 25.1 KG/M2 | TEMPERATURE: 97.3 F | OXYGEN SATURATION: 96 % | SYSTOLIC BLOOD PRESSURE: 117 MMHG

## 2022-08-01 DIAGNOSIS — R79.89 HIGH SERUM HIGH DENSITY LIPOPROTEIN (HDL): Primary | ICD-10-CM

## 2022-08-01 DIAGNOSIS — E83.52 SERUM CALCIUM ELEVATED: ICD-10-CM

## 2022-08-01 DIAGNOSIS — E78.5 HYPERLIPIDEMIA, UNSPECIFIED HYPERLIPIDEMIA TYPE: ICD-10-CM

## 2022-08-01 DIAGNOSIS — E78.1 HYPERTRIGLYCERIDEMIA: ICD-10-CM

## 2022-08-01 DIAGNOSIS — Z12.31 ENCOUNTER FOR SCREENING MAMMOGRAM FOR MALIGNANT NEOPLASM OF BREAST: ICD-10-CM

## 2022-08-01 DIAGNOSIS — N95.1 MENOPAUSAL SYMPTOMS: ICD-10-CM

## 2022-08-01 DIAGNOSIS — Z78.0 POST-MENOPAUSAL: ICD-10-CM

## 2022-08-01 DIAGNOSIS — D72.819 LEUKOPENIA, UNSPECIFIED TYPE: ICD-10-CM

## 2022-08-01 PROCEDURE — 99214 OFFICE O/P EST MOD 30 MIN: CPT | Performed by: INTERNAL MEDICINE

## 2022-08-01 PROCEDURE — 1123F ACP DISCUSS/DSCN MKR DOCD: CPT | Performed by: INTERNAL MEDICINE

## 2022-08-01 RX ORDER — OMEGA-3-ACID ETHYL ESTERS 1 G/1
2 CAPSULE, LIQUID FILLED ORAL 2 TIMES DAILY
Qty: 360 CAPSULE | Refills: 3 | Status: SHIPPED | OUTPATIENT
Start: 2022-08-01

## 2022-08-01 RX ORDER — ESTRADIOL 0.1 MG/D
FILM, EXTENDED RELEASE TRANSDERMAL
Qty: 12 PATCH | Refills: 3 | Status: SHIPPED | OUTPATIENT
Start: 2022-08-01

## 2022-08-01 ASSESSMENT — PATIENT HEALTH QUESTIONNAIRE - PHQ9
SUM OF ALL RESPONSES TO PHQ9 QUESTIONS 1 & 2: 0
SUM OF ALL RESPONSES TO PHQ QUESTIONS 1-9: 0
SUM OF ALL RESPONSES TO PHQ QUESTIONS 1-9: 0
2. FEELING DOWN, DEPRESSED OR HOPELESS: 0
SUM OF ALL RESPONSES TO PHQ QUESTIONS 1-9: 0
1. LITTLE INTEREST OR PLEASURE IN DOING THINGS: 0
SUM OF ALL RESPONSES TO PHQ QUESTIONS 1-9: 0

## 2022-08-01 ASSESSMENT — ENCOUNTER SYMPTOMS
SHORTNESS OF BREATH: 0
EYE PAIN: 0
ABDOMINAL PAIN: 0
BACK PAIN: 0

## 2022-08-01 NOTE — PROGRESS NOTES
Subjective:      Patient ID: Gael Combs is a 75 y.o. female who presents today with:  Chief Complaint   Patient presents with    Medication Refill     PT IS HERE TODAY FOR RX REFILL         HPI      Here for follow up   Past Medical History:   Diagnosis Date    Hyperlipidemia      Past Surgical History:   Procedure Laterality Date    APPENDECTOMY  1959    BREAST BIOPSY      COLONOSCOPY N/A 9/16/2019    COLORECTAL CANCER SCREENING, NOT HIGH RISK performed by Otis Zepeda MD at [address] [place] [address] (CERVIX STATUS UNKNOWN)      NIMESH AND BSO (CERVIX REMOVED)  1995     Social History     Socioeconomic History    Marital status:      Spouse name: Not on file    Number of children: Not on file    Years of education: Not on file    Highest education level: Not on file   Occupational History    Not on file   Tobacco Use    Smoking status: Never    Smokeless tobacco: Never   Vaping Use    Vaping Use: Not on file   Substance and Sexual Activity    Alcohol use: Not on file     Comment: socially     Drug use: Never    Sexual activity: Yes     Comment:     Other Topics Concern    Not on file   Social History Narrative    Not on file     Social Determinants of Health     Financial Resource Strain: Low Risk     Difficulty of Paying Living Expenses: Not hard at all   Food Insecurity: No Food Insecurity    Worried About Running Out of Food in the Last Year: Never true    Ran Out of Food in the Last Year: Never true   Transportation Needs: Not on file   Physical Activity: Not on file   Stress: Not on file   Social Connections: Not on file   Intimate Partner Violence: Not on file   Housing Stability: Not on file     Allergies   Allergen Reactions    Peppermint Oil Other (See Comments)     Sneezing, gagging without SOB     Current Outpatient Medications on File Prior to Visit   Medication Sig Dispense Refill    brompheniramine-pseudoephedrine-DM 2-30-10 MG/5ML syrup Take 5 mLs by mouth 4 times daily as needed for Congestion or Cough 180 mL 0    Magnesium Malate 1250 (141.7 Mg) MG TABS Take 2,500 mg by mouth once       Methylsulfonylmethane (MSM) 1000 MG CAPS Take 2,000 mg by mouth once       b complex vitamins capsule Take 1 capsule by mouth 2 times daily      vitamin B-12 (CYANOCOBALAMIN) 500 MCG tablet Take 5,000 mcg by mouth daily      Red Yeast Rice 600 MG TABS Take 1,200 mg by mouth once       Cholecalciferol (VITAMIN D3) 2000 units CAPS Take 5,000 Units by mouth daily       Probiotic Product (PROBIOTIC ADVANCED PO) Take 540 mg by mouth daily      folic acid (FOLVITE) 620 MCG tablet Take 800 mcg by mouth daily      TURMERIC PO Take 900 mg by mouth      FIBER ADULT GUMMIES PO Take 6 g by mouth once       No current facility-administered medications on file prior to visit. I have personally reviewed the ROS, PMH, PFH, and social history     Review of Systems   Constitutional:  Negative for chills and fever. HENT:  Negative for congestion. Eyes:  Negative for pain. Respiratory:  Negative for shortness of breath. Cardiovascular:  Negative for chest pain. Gastrointestinal:  Negative for abdominal pain. Genitourinary:  Negative for hematuria. Musculoskeletal:  Negative for back pain. Allergic/Immunologic: Negative for immunocompromised state. Neurological:  Negative for headaches. Psychiatric/Behavioral:  Negative for hallucinations. Objective:   /74 (Site: Right Upper Arm, Position: Sitting)   Pulse 71   Temp 97.3 °F (36.3 °C) (Temporal)   Wt 170 lb (77.1 kg)   SpO2 96%   BMI 25.10 kg/m²     Physical Exam  Constitutional:       General: She is not in acute distress. Appearance: Normal appearance. She is not ill-appearing, toxic-appearing or diaphoretic. HENT:      Head: Normocephalic. Neck:      Vascular: No carotid bruit. Cardiovascular:      Rate and Rhythm: Normal rate and regular rhythm. Pulses: Normal pulses. Heart sounds: Normal heart sounds.  No murmur heard. No friction rub. No gallop. Pulmonary:      Effort: Pulmonary effort is normal. No respiratory distress. Breath sounds: Normal breath sounds. No wheezing, rhonchi or rales. Abdominal:      General: Abdomen is flat. There is no distension. Palpations: Abdomen is soft. Tenderness: There is no abdominal tenderness. There is no right CVA tenderness, left CVA tenderness, guarding or rebound. Musculoskeletal:      Cervical back: Neck supple. Right lower leg: No edema. Left lower leg: No edema. Skin:     General: Skin is warm. Findings: No erythema or rash. Neurological:      Mental Status: She is alert. Psychiatric:         Mood and Affect: Mood normal.         Assessment:       Diagnosis Orders   1. High serum high density lipoprotein (HDL)  TSH with Reflex    Vitamin D 25 Hydroxy    CBC with Auto Differential    Comprehensive Metabolic Panel    Lipid Panel      2. Hypertriglyceridemia  TSH with Reflex    Vitamin D 25 Hydroxy    CBC with Auto Differential    Comprehensive Metabolic Panel    Lipid Panel    omega-3 acid ethyl esters (LOVAZA) 1 g capsule    T4, Free      3. Post-menopausal  TSH with Reflex    Vitamin D 25 Hydroxy    CBC with Auto Differential    Comprehensive Metabolic Panel    Lipid Panel      4. Menopausal symptoms  estradiol (VIVELLE) 0.1 MG/24HR      5.  Encounter for screening mammogram for malignant neoplasm of breast  ZOILA DIGITAL SCREEN W OR WO CAD BILATERAL            Plan:     vc.  See te to ob/gyn about td patch   Continue chronic medications  cac test. (await repeat lab)   Wants to wait on dexa for now                 Orders Placed This Encounter   Procedures    ZOILA DIGITAL SCREEN W OR WO CAD BILATERAL     Standing Status:   Future     Standing Expiration Date:   10/1/2023     Order Specific Question:   Reason for exam:     Answer:   screening    TSH with Reflex     Standing Status:   Future     Standing Expiration Date:   8/1/2023 Vitamin D 25 Hydroxy     Standing Status:   Future     Standing Expiration Date:   8/1/2023    CBC with Auto Differential     Standing Status:   Future     Standing Expiration Date:   8/1/2023    Comprehensive Metabolic Panel     Standing Status:   Future     Standing Expiration Date:   8/1/2023    Lipid Panel     Standing Status:   Future     Standing Expiration Date:   8/1/2023     Order Specific Question:   Is Patient Fasting?/# of Hours     Answer:   10    T4, Free     Standing Status:   Future     Standing Expiration Date:   8/1/2023     Orders Placed This Encounter   Medications    omega-3 acid ethyl esters (LOVAZA) 1 g capsule     Sig: Take 2 capsules by mouth in the morning and 2 capsules before bedtime. Dispense:  360 capsule     Refill:  3    estradiol (VIVELLE) 0.1 MG/24HR     Sig: APPLY 1 PATCH ON THE SKIN once A WEEK     Dispense:  12 patch     Refill:  3   No si/hi  Below is from dr Ab Campa if it is helping her with some symptoms. If not I would discontinue \" with regards to keeping her on td patch  Patient understands risk of cancer, DVT, etc (td estrogen patch)   She wants to stay on this product. 30 Minutes sPENT. If anything should change or worsen call ASAP, don't wait for next scheduled appointment. No follow-ups on file.       Jessica Hyman MD

## 2022-08-02 DIAGNOSIS — E78.1 HYPERTRIGLYCERIDEMIA: ICD-10-CM

## 2022-08-02 RX ORDER — OMEGA-3-ACID ETHYL ESTERS 1 G/1
2 CAPSULE, LIQUID FILLED ORAL 2 TIMES DAILY
Qty: 360 CAPSULE | Refills: 3 | OUTPATIENT
Start: 2022-08-02

## 2022-08-05 DIAGNOSIS — E78.1 HYPERTRIGLYCERIDEMIA: ICD-10-CM

## 2022-08-05 DIAGNOSIS — R79.89 HIGH SERUM HIGH DENSITY LIPOPROTEIN (HDL): ICD-10-CM

## 2022-08-05 DIAGNOSIS — Z78.0 POST-MENOPAUSAL: ICD-10-CM

## 2022-08-05 LAB
ALBUMIN SERPL-MCNC: 4.8 G/DL (ref 3.5–4.6)
ALP BLD-CCNC: 51 U/L (ref 40–130)
ALT SERPL-CCNC: 23 U/L (ref 0–33)
ANION GAP SERPL CALCULATED.3IONS-SCNC: 14 MEQ/L (ref 9–15)
AST SERPL-CCNC: 23 U/L (ref 0–35)
BASOPHILS ABSOLUTE: 0 K/UL (ref 0–0.2)
BASOPHILS RELATIVE PERCENT: 0.6 %
BILIRUB SERPL-MCNC: 0.5 MG/DL (ref 0.2–0.7)
BUN BLDV-MCNC: 12 MG/DL (ref 8–23)
CALCIUM SERPL-MCNC: 10.4 MG/DL (ref 8.5–9.9)
CHLORIDE BLD-SCNC: 103 MEQ/L (ref 95–107)
CHOLESTEROL, TOTAL: 276 MG/DL (ref 0–199)
CO2: 26 MEQ/L (ref 20–31)
CREAT SERPL-MCNC: 0.83 MG/DL (ref 0.5–0.9)
EOSINOPHILS ABSOLUTE: 0.1 K/UL (ref 0–0.7)
EOSINOPHILS RELATIVE PERCENT: 2.9 %
GFR AFRICAN AMERICAN: >60
GFR NON-AFRICAN AMERICAN: >60
GLOBULIN: 2.8 G/DL (ref 2.3–3.5)
GLUCOSE BLD-MCNC: 106 MG/DL (ref 70–99)
HCT VFR BLD CALC: 41.6 % (ref 37–47)
HDLC SERPL-MCNC: 90 MG/DL (ref 40–59)
HEMOGLOBIN: 14.2 G/DL (ref 12–16)
LDL CHOLESTEROL CALCULATED: 160 MG/DL (ref 0–129)
LYMPHOCYTES ABSOLUTE: 1.4 K/UL (ref 1–4.8)
LYMPHOCYTES RELATIVE PERCENT: 33.1 %
MCH RBC QN AUTO: 30.9 PG (ref 27–31.3)
MCHC RBC AUTO-ENTMCNC: 34.2 % (ref 33–37)
MCV RBC AUTO: 90.3 FL (ref 82–100)
MONOCYTES ABSOLUTE: 0.3 K/UL (ref 0.2–0.8)
MONOCYTES RELATIVE PERCENT: 8.1 %
NEUTROPHILS ABSOLUTE: 2.3 K/UL (ref 1.4–6.5)
NEUTROPHILS RELATIVE PERCENT: 55.3 %
PDW BLD-RTO: 13.9 % (ref 11.5–14.5)
PLATELET # BLD: 223 K/UL (ref 130–400)
POTASSIUM SERPL-SCNC: 4.8 MEQ/L (ref 3.4–4.9)
RBC # BLD: 4.6 M/UL (ref 4.2–5.4)
SODIUM BLD-SCNC: 143 MEQ/L (ref 135–144)
T4 FREE: 1.13 NG/DL (ref 0.84–1.68)
TOTAL PROTEIN: 7.6 G/DL (ref 6.3–8)
TRIGL SERPL-MCNC: 128 MG/DL (ref 0–150)
TSH REFLEX: 4.11 UIU/ML (ref 0.44–3.86)
WBC # BLD: 4.1 K/UL (ref 4.8–10.8)

## 2022-08-06 LAB — VITAMIN D 25-HYDROXY: 68.1 NG/ML

## 2022-08-08 ENCOUNTER — TELEPHONE (OUTPATIENT)
Dept: FAMILY MEDICINE CLINIC | Age: 67
End: 2022-08-08

## 2022-08-09 ENCOUNTER — TELEPHONE (OUTPATIENT)
Dept: PRIMARY CARE CLINIC | Age: 67
End: 2022-08-09

## 2022-08-09 ENCOUNTER — TELEMEDICINE (OUTPATIENT)
Dept: PRIMARY CARE CLINIC | Age: 67
End: 2022-08-09
Payer: MEDICARE

## 2022-08-09 DIAGNOSIS — Z00.00 MEDICARE ANNUAL WELLNESS VISIT, SUBSEQUENT: Primary | ICD-10-CM

## 2022-08-09 PROCEDURE — 1123F ACP DISCUSS/DSCN MKR DOCD: CPT | Performed by: NURSE PRACTITIONER

## 2022-08-09 PROCEDURE — G0439 PPPS, SUBSEQ VISIT: HCPCS | Performed by: NURSE PRACTITIONER

## 2022-08-09 SDOH — ECONOMIC STABILITY: TRANSPORTATION INSECURITY
IN THE PAST 12 MONTHS, HAS LACK OF TRANSPORTATION KEPT YOU FROM MEETINGS, WORK, OR FROM GETTING THINGS NEEDED FOR DAILY LIVING?: NO

## 2022-08-09 SDOH — ECONOMIC STABILITY: TRANSPORTATION INSECURITY
IN THE PAST 12 MONTHS, HAS THE LACK OF TRANSPORTATION KEPT YOU FROM MEDICAL APPOINTMENTS OR FROM GETTING MEDICATIONS?: NO

## 2022-08-09 ASSESSMENT — PATIENT HEALTH QUESTIONNAIRE - PHQ9
SUM OF ALL RESPONSES TO PHQ QUESTIONS 1-9: 0
2. FEELING DOWN, DEPRESSED OR HOPELESS: 0
SUM OF ALL RESPONSES TO PHQ QUESTIONS 1-9: 0
SUM OF ALL RESPONSES TO PHQ9 QUESTIONS 1 & 2: 0
1. LITTLE INTEREST OR PLEASURE IN DOING THINGS: 0

## 2022-08-09 ASSESSMENT — LIFESTYLE VARIABLES: HOW OFTEN DO YOU HAVE A DRINK CONTAINING ALCOHOL: NEVER

## 2022-08-09 NOTE — PATIENT INSTRUCTIONS
Personalized Preventive Plan for Sean Escudero - 8/9/2022  Medicare offers a range of preventive health benefits. Some of the tests and screenings are paid in full while other may be subject to a deductible, co-insurance, and/or copay. Some of these benefits include a comprehensive review of your medical history including lifestyle, illnesses that may run in your family, and various assessments and screenings as appropriate. After reviewing your medical record and screening and assessments performed today your provider may have ordered immunizations, labs, imaging, and/or referrals for you. A list of these orders (if applicable) as well as your Preventive Care list are included within your After Visit Summary for your review. Other Preventive Recommendations:    A preventive eye exam performed by an eye specialist is recommended every 1-2 years to screen for glaucoma; cataracts, macular degeneration, and other eye disorders. A preventive dental visit is recommended every 6 months. Try to get at least 150 minutes of exercise per week or 10,000 steps per day on a pedometer . Order or download the FREE \"Exercise & Physical Activity: Your Everyday Guide\" from [employer]. Call 4-834.568.3520 or search [employer] online. You need 6240-2291 mg of calcium and 8018-5427 IU of vitamin D per day. It is possible to meet your calcium requirement with diet alone, but a vitamin D supplement is usually necessary to meet this goal.  When exposed to the sun, use a sunscreen that protects against both UVA and UVB radiation with an SPF of 30 or greater. Reapply every 2 to 3 hours or after sweating, drying off with a towel, or swimming. Always wear a seat belt when traveling in a car. Always wear a helmet when riding a bicycle or motorcycle.

## 2022-08-09 NOTE — PROGRESS NOTES
Medicare Annual Wellness Visit    Sylwia Batres is here for Medicare AWV    Assessment & Plan   Medicare annual wellness visit, subsequent    Recommendations for Preventive Services Due: see orders and patient instructions/AVS.  Recommended screening schedule for the next 5-10 years is provided to the patient in written form: see Patient Instructions/AVS.     Return for Medicare Annual Wellness Visit in 1 year. Subjective       Patient's complete Health Risk Assessment and screening values have been reviewed and are found in Flowsheets. The following problems were reviewed today and where indicated follow up appointments were made and/or referrals ordered. Positive Risk Factor Screenings with Interventions:             General Health and ACP:  General  In general, how would you say your health is?: Very Good  In the past 7 days, have you experienced any of the following: New or Increased Pain, New or Increased Fatigue, Loneliness, Social Isolation, Stress or Anger?: No  Do you get the social and emotional support that you need?: Yes  Do you have a Living Will?: (!) No    Advance Directives       Power of  Living Will ACP-Advance Directive ACP-Power of     Not on File Filed on 09/17/19 Filed Not on File          General Health Risk Interventions:  No Living Will: Discussed ACP referral and patient will thick about this and let office know if she needs referral.     Hearing/Vision:  Do you or your family notice any trouble with your hearing that hasn't been managed with hearing aids?: No  Do you have difficulty driving, watching TV, or doing any of your daily activities because of your eyesight?: No  Have you had an eye exam within the past year?: (!) No  No results found. Hearing/Vision Interventions:  No concerns at this time.  Has been over 2 years since last eye exam-encouraged to schedule appointment for exam            Objective      Patient-Reported Vitals  No data recorded Allergies   Allergen Reactions    Peppermint Oil Other (See Comments)     Sneezing, gagging without SOB     Prior to Visit Medications    Medication Sig Taking? Authorizing Provider   omega-3 acid ethyl esters (LOVAZA) 1 g capsule Take 2 capsules by mouth in the morning and 2 capsules before bedtime. Matthew Iverson MD   estradiol (VIVELLE) 0.1 MG/24HR APPLY 1 PATCH ON THE SKIN once A WEEK  Matthew Iverson MD   brompheniramine-pseudoephedrine-DM 2-30-10 MG/5ML syrup Take 5 mLs by mouth 4 times daily as needed for Congestion or Cough  Abdi Couch, APRN - CNP   Magnesium Malate 1250 (141.7 Mg) MG TABS Take 2,500 mg by mouth once   Historical Provider, MD   Methylsulfonylmethane (MSM) 1000 MG CAPS Take 2,000 mg by mouth once   Historical Provider, MD   b complex vitamins capsule Take 1 capsule by mouth 2 times daily  Historical Provider, MD   vitamin B-12 (CYANOCOBALAMIN) 500 MCG tablet Take 5,000 mcg by mouth daily  Historical Provider, MD   Red Yeast Rice 600 MG TABS Take 1,200 mg by mouth once   Historical Provider, MD   Cholecalciferol (VITAMIN D3) 2000 units CAPS Take 5,000 Units by mouth daily   Historical Provider, MD   Probiotic Product (PROBIOTIC ADVANCED PO) Take 540 mg by mouth daily  Historical Provider, MD   folic acid (FOLVITE) 541 MCG tablet Take 800 mcg by mouth daily  Historical Provider, MD   TURMERIC PO Take 900 mg by mouth  Historical Provider, MD   FIBER ADULT GUMMIES PO Take 6 g by mouth once  Historical Provider, MD Tsang (Including outside providers/suppliers regularly involved in providing care):   Patient Care Team:  Matthew Iverson MD as PCP - General (Internal Medicine)  Matthew Iverson MD as PCP - REHABILITATION [facility] Empaneled Provider     Reviewed and updated this visit:  Tobacco  Allergies  Meds  Med Hx  Surg Hx  Soc Hx  Fam Hx          Lucio Robles, was evaluated through a synchronous (real-time) audio-video encounter.  The patient (or guardian if applicable) is aware that this is a billable service, which includes applicable co-pays. This Virtual Visit was conducted with patient's (and/or legal guardian's) consent. The visit was conducted pursuant to the emergency declaration under the [facility], [address] [place] [address] authority and the [place] and [employer] General Act. Patient identification was verified, and a caregiver was present when appropriate. The patient was located at Home: [address] [address]. Provider was located at [facility] (Appt Dept): [address] [place] [address],  [facility].

## 2022-10-13 ENCOUNTER — HOSPITAL ENCOUNTER (OUTPATIENT)
Dept: WOMENS IMAGING | Age: 67
Discharge: HOME | End: 2022-10-15
Payer: MEDICARE

## 2022-10-13 VITALS — BODY MASS INDEX: 25.1 KG/M2 | HEIGHT: 69 IN

## 2022-10-13 DIAGNOSIS — Z12.31 ENCOUNTER FOR SCREENING MAMMOGRAM FOR MALIGNANT NEOPLASM OF BREAST: ICD-10-CM

## 2022-10-13 PROCEDURE — 77063 BREAST TOMOSYNTHESIS BI: CPT

## 2023-01-05 ENCOUNTER — OFFICE VISIT (OUTPATIENT)
Dept: FAMILY MEDICINE CLINIC | Age: 68
End: 2023-01-05

## 2023-01-05 VITALS
BODY MASS INDEX: 24.73 KG/M2 | TEMPERATURE: 97.1 F | HEART RATE: 63 BPM | SYSTOLIC BLOOD PRESSURE: 120 MMHG | WEIGHT: 167 LBS | HEIGHT: 69 IN | DIASTOLIC BLOOD PRESSURE: 64 MMHG | OXYGEN SATURATION: 98 %

## 2023-01-05 DIAGNOSIS — N95.1 MENOPAUSAL SYMPTOMS: ICD-10-CM

## 2023-01-05 DIAGNOSIS — L81.9 PIGMENTED SKIN LESION OF UNCERTAIN BEHAVIOR OF NECK: ICD-10-CM

## 2023-01-05 DIAGNOSIS — U07.1 COVID-19: Primary | ICD-10-CM

## 2023-01-05 RX ORDER — OMEGA-3-ACID ETHYL ESTERS 1 G/1
2 CAPSULE, LIQUID FILLED ORAL 2 TIMES DAILY
Qty: 360 CAPSULE | Refills: 3 | Status: SHIPPED | OUTPATIENT
Start: 2023-01-05

## 2023-01-05 RX ORDER — ESTRADIOL 0.1 MG/D
FILM, EXTENDED RELEASE TRANSDERMAL
Qty: 12 PATCH | Refills: 3 | Status: SHIPPED | OUTPATIENT
Start: 2023-01-05

## 2023-01-05 RX ORDER — AZITHROMYCIN 250 MG/1
250 TABLET, FILM COATED ORAL SEE ADMIN INSTRUCTIONS
Qty: 6 TABLET | Refills: 0 | Status: CANCELLED | OUTPATIENT
Start: 2023-01-05 | End: 2023-01-10

## 2023-01-05 RX ORDER — AZITHROMYCIN 500 MG/1
500 TABLET, FILM COATED ORAL DAILY
Qty: 3 TABLET | Refills: 0 | Status: SHIPPED | OUTPATIENT
Start: 2023-01-05 | End: 2023-01-08

## 2023-01-05 SDOH — ECONOMIC STABILITY: FOOD INSECURITY: WITHIN THE PAST 12 MONTHS, YOU WORRIED THAT YOUR FOOD WOULD RUN OUT BEFORE YOU GOT MONEY TO BUY MORE.: NEVER TRUE

## 2023-01-05 SDOH — ECONOMIC STABILITY: FOOD INSECURITY: WITHIN THE PAST 12 MONTHS, THE FOOD YOU BOUGHT JUST DIDN'T LAST AND YOU DIDN'T HAVE MONEY TO GET MORE.: NEVER TRUE

## 2023-01-05 ASSESSMENT — PATIENT HEALTH QUESTIONNAIRE - PHQ9
SUM OF ALL RESPONSES TO PHQ QUESTIONS 1-9: 0
2. FEELING DOWN, DEPRESSED OR HOPELESS: 0
1. LITTLE INTEREST OR PLEASURE IN DOING THINGS: 0
SUM OF ALL RESPONSES TO PHQ9 QUESTIONS 1 & 2: 0
SUM OF ALL RESPONSES TO PHQ QUESTIONS 1-9: 0

## 2023-01-05 ASSESSMENT — ENCOUNTER SYMPTOMS
BLOOD IN STOOL: 0
VOMITING: 0
SORE THROAT: 0
WHEEZING: 0
SHORTNESS OF BREATH: 0
NAUSEA: 0
COUGH: 1

## 2023-01-05 ASSESSMENT — SOCIAL DETERMINANTS OF HEALTH (SDOH): HOW HARD IS IT FOR YOU TO PAY FOR THE VERY BASICS LIKE FOOD, HOUSING, MEDICAL CARE, AND HEATING?: NOT HARD AT ALL

## 2023-01-05 NOTE — PROGRESS NOTES
[facility]  [address] [facility] [address]  Dept: [phone]  Dept Fax: [phone]  Loc: [facility],[address] (: 1955) is a 75 y.o. female, New patient, here for evaluation of the following chief complaint(s):  Post-COVID Symptoms (Positive on 22), Fatigue, and Cough      PCP:  Junior Higginbotham, ELIZABETH Saucedo CNP      Fatigue  This is a new problem. The current episode started 1 to 4 weeks ago. The problem occurs daily. The problem has been unchanged. Associated symptoms include coughing and fatigue. Pertinent negatives include no chest pain, fever, nausea, sore throat or vomiting. The symptoms are aggravated by bending. She has tried nothing for the symptoms. Cough  This is a new problem. The current episode started 1 to 4 weeks ago. The problem has been unchanged. The problem occurs constantly. The cough is Productive of sputum. Pertinent negatives include no chest pain, fever, sore throat, shortness of breath or wheezing. Nothing aggravates the symptoms. She has tried nothing for the symptoms. There is no history of asthma or COPD. Has a left neck lesion that has been changing, interested in this being checked. Isnt bleeding, but has \"sunken\" and is now crusty. Review of Systems   Constitutional:  Positive for fatigue. Negative for fever. HENT:  Negative for sore throat. Respiratory:  Positive for cough. Negative for shortness of breath and wheezing. Cardiovascular: Negative. Negative for chest pain, palpitations and leg swelling. Gastrointestinal:  Negative for blood in stool, nausea and vomiting. Psychiatric/Behavioral: Negative. Negative for behavioral problems. The patient is not nervous/anxious.       Past Medical History:   Diagnosis Date    Hyperlipidemia      Past Surgical History:   Procedure Laterality Date    APPENDECTOMY 1959    BREAST BIOPSY      COLONOSCOPY N/A 9/16/2019    COLORECTAL CANCER SCREENING, NOT HIGH RISK performed by Reji Kohler MD at [facility]    HYSTERECTOMY (CERVIX STATUS UNKNOWN)      NIMESH AND BSO (CERVIX REMOVED)  1995     Social History     Socioeconomic History    Marital status:      Spouse name: Not on file    Number of children: Not on file    Years of education: Not on file    Highest education level: Not on file   Occupational History    Not on file   Tobacco Use    Smoking status: Never    Smokeless tobacco: Never   Vaping Use    Vaping Use: Not on file   Substance and Sexual Activity    Alcohol use: Not on file     Comment: socially     Drug use: Never    Sexual activity: Yes     Comment:     Other Topics Concern    Not on file   Social History Narrative    Not on file     Social Determinants of Health     Financial Resource Strain: Low Risk     Difficulty of Paying Living Expenses: Not hard at all   Food Insecurity: No Food Insecurity    Worried About Running Out of Food in the Last Year: Never true    Ran Out of Food in the Last Year: Never true   Transportation Needs: No Transportation Needs    Lack of Transportation (Medical): No    Lack of Transportation (Non-Medical): No   Physical Activity: Sufficiently Active    Days of Exercise per Week: 5 days    Minutes of Exercise per Session: 80 min   Stress: Not on file   Social Connections: Not on file   Intimate Partner Violence: Not on file   Housing Stability: Not on file     Family History   Problem Relation Age of Onset    Osteoarthritis Mother     Migraines Mother     Heart Failure Father     Osteoarthritis Father     Heart Disease Father       Allergies   Allergen Reactions    Peppermint Oil Other (See Comments)     Sneezing, gagging without SOB     Prior to Admission medications    Medication Sig Start Date End Date Taking? Authorizing Provider   ascorbic acid (VITAMIN C) 100 MG tablet Take 100 mg by mouth daily   Yes  Historical Provider, MD   estradiol (VIVELLE) 0.1 MG/24HR APPLY 1 PATCH ON THE SKIN once A WEEK 1/5/23  Yes ELIZABETH Donald CNP   omega-3 acid ethyl esters (LOVAZA) 1 g capsule Take 2 capsules by mouth 2 times daily 1/5/23  Yes Luis ELIZABETH Liao - CNP   azithromycin (ZITHROMAX) 500 MG tablet Take 1 tablet by mouth daily for 3 days 1/5/23 1/8/23 Yes ELIZABETH Donald - CNP   Magnesium Malate 1250 (141.7 Mg) MG TABS Take 2,500 mg by mouth once    Yes Historical Provider, MD   Methylsulfonylmethane (MSM) 1000 MG CAPS Take 2,000 mg by mouth once    Yes Historical Provider, MD   b complex vitamins capsule Take 1 capsule by mouth 2 times daily   Yes Historical Provider, MD   vitamin B-12 (CYANOCOBALAMIN) 500 MCG tablet Take 5,000 mcg by mouth daily   Yes Historical Provider, MD   Red Yeast Rice 600 MG TABS Take 1,200 mg by mouth once    Yes Historical Provider, MD   Cholecalciferol (VITAMIN D3) 2000 units CAPS Take 5,000 Units by mouth daily    Yes Historical Provider, MD   Probiotic Product (PROBIOTIC ADVANCED PO) Take 540 mg by mouth daily   Yes Historical Provider, MD   folic acid (FOLVITE) 928 MCG tablet Take 800 mcg by mouth daily   Yes Historical Provider, MD   TURMERIC PO Take 900 mg by mouth   Yes Historical Provider, MD   FIBER ADULT GUMMIES PO Take 6 g by mouth once   Yes Historical Provider, MD                I have reviewed the patient's medical history in detail and updated the computerized patient record. OBJECTIVE    Vitals:    01/05/23 0901   BP: 120/64   Pulse: 63   Temp: 97.1 °F (36.2 °C)   TempSrc: Temporal   SpO2: 98%   Weight: 167 lb (75.8 kg)   Height: 5' 9\" (1.753 m)       Physical Exam  Constitutional:       Appearance: Normal appearance. HENT:      Head: Normocephalic. Cardiovascular:      Rate and Rhythm: Normal rate and regular rhythm. Heart sounds: No murmur heard. Pulmonary:      Effort: Pulmonary effort is normal. No respiratory distress.       Breath sounds: Normal breath sounds. No wheezing. Skin:     General: Skin is warm and dry. Neurological:      General: No focal deficit present. Mental Status: She is alert and oriented to person, place, and time. ASSESSMENT/ PLAN    1. Menopausal symptoms  Refilled patch for patient. - estradiol (VIVELLE) 0.1 MG/24HR; APPLY 1 PATCH ON THE SKIN once A WEEK  Dispense: 12 patch; Refill: 3    2. COVID-19  12/5 was positive at home, cough is lingering. Z memo sent to pharmacy. - azithromycin (ZITHROMAX) 500 MG tablet; Take 1 tablet by mouth daily for 3 days  Dispense: 3 tablet; Refill: 0    3. Pigmented skin lesion of uncertain behavior of neck  [facility] dermatology referral sent. - Amb External Referral To Dermatology              On this date 1/5/2023 I have spent 30 minutes reviewing previous notes, test results and face to face with the patient discussing the diagnosis and importance of compliance with the treatment plan as well as documenting on the day of the visit. No follow-ups on file.      Electronically signed by:  ELIZABETH Mooney CNP   1/5/23

## 2023-01-10 ENCOUNTER — TELEPHONE (OUTPATIENT)
Dept: FAMILY MEDICINE CLINIC | Age: 68
End: 2023-01-10

## 2023-01-10 DIAGNOSIS — N95.1 MENOPAUSAL SYMPTOMS: ICD-10-CM

## 2023-01-10 RX ORDER — ESTRADIOL 0.1 MG/D
FILM, EXTENDED RELEASE TRANSDERMAL
Qty: 12 PATCH | Refills: 0 | Status: SHIPPED | OUTPATIENT
Start: 2023-01-10

## 2023-01-10 RX ORDER — OMEGA-3-ACID ETHYL ESTERS 1 G/1
2 CAPSULE, LIQUID FILLED ORAL 2 TIMES DAILY
Qty: 360 CAPSULE | Refills: 3 | Status: CANCELLED | OUTPATIENT
Start: 2023-01-10

## 2023-01-10 NOTE — TELEPHONE ENCOUNTER
----- Message from April Nagle sent at 1/10/2023  2:22 PM EST -----  Subject: Medication Problem    Medication: estradiol (VIVELLE) 0.1 MG/24HR  Dosage: APPLY 1 PATCH ON THE SKIN once A WEEK  Ordering Provider: Earlene Partida    Question/Problem: Patient was contacted by [facility], they need a prior   authorization to approve this medication. please call patient back to   advise, thank you       Pharmacy: [facility] ([facility] ) - [place], [place]   - [address] [facility] [phone] - F [phone]    ---------------------------------------------------------------------------  --------------  CALL BACK INFO  [phone]; OK to leave message on voicemail  ---------------------------------------------------------------------------  --------------    SCRIPT ANSWERS  Relationship to Patient: Other  Representative Name: syed  Is the Representative on the appropriate HIPAA document in Epic: Yes

## 2023-01-11 NOTE — TELEPHONE ENCOUNTER
PA Dept called about the estradiol. They need to know if the patient has tried the femring. They need a response by 1/13 or the PA will be denied. Please call [phone]. Ref # PA- [id number]      Thank you.

## 2023-03-16 NOTE — TELEPHONE ENCOUNTER
You saw her in 01/2022  If need be she can see you for fu  May I keep her on estrogen td patch ? Yes 4 = No assist / stand by assistance

## 2023-04-04 ENCOUNTER — OFFICE VISIT (OUTPATIENT)
Dept: FAMILY MEDICINE CLINIC | Age: 68
End: 2023-04-04
Payer: MEDICARE

## 2023-04-04 VITALS
BODY MASS INDEX: 24.73 KG/M2 | HEART RATE: 81 BPM | OXYGEN SATURATION: 98 % | RESPIRATION RATE: 14 BRPM | HEIGHT: 69 IN | SYSTOLIC BLOOD PRESSURE: 110 MMHG | WEIGHT: 167 LBS | DIASTOLIC BLOOD PRESSURE: 80 MMHG

## 2023-04-04 DIAGNOSIS — E78.5 HYPERLIPIDEMIA, UNSPECIFIED HYPERLIPIDEMIA TYPE: ICD-10-CM

## 2023-04-04 DIAGNOSIS — L65.9 HAIR LOSS: ICD-10-CM

## 2023-04-04 DIAGNOSIS — E03.8 SUBCLINICAL HYPOTHYROIDISM: Primary | ICD-10-CM

## 2023-04-04 PROCEDURE — 3017F COLORECTAL CA SCREEN DOC REV: CPT | Performed by: INTERNAL MEDICINE

## 2023-04-04 PROCEDURE — G8420 CALC BMI NORM PARAMETERS: HCPCS | Performed by: INTERNAL MEDICINE

## 2023-04-04 PROCEDURE — 1090F PRES/ABSN URINE INCON ASSESS: CPT | Performed by: INTERNAL MEDICINE

## 2023-04-04 PROCEDURE — 1123F ACP DISCUSS/DSCN MKR DOCD: CPT | Performed by: INTERNAL MEDICINE

## 2023-04-04 PROCEDURE — G8400 PT W/DXA NO RESULTS DOC: HCPCS | Performed by: INTERNAL MEDICINE

## 2023-04-04 PROCEDURE — 1036F TOBACCO NON-USER: CPT | Performed by: INTERNAL MEDICINE

## 2023-04-04 PROCEDURE — G8427 DOCREV CUR MEDS BY ELIG CLIN: HCPCS | Performed by: INTERNAL MEDICINE

## 2023-04-04 PROCEDURE — 99214 OFFICE O/P EST MOD 30 MIN: CPT | Performed by: INTERNAL MEDICINE

## 2023-04-04 SDOH — ECONOMIC STABILITY: FOOD INSECURITY: WITHIN THE PAST 12 MONTHS, THE FOOD YOU BOUGHT JUST DIDN'T LAST AND YOU DIDN'T HAVE MONEY TO GET MORE.: NEVER TRUE

## 2023-04-04 SDOH — ECONOMIC STABILITY: HOUSING INSECURITY
IN THE LAST 12 MONTHS, WAS THERE A TIME WHEN YOU DID NOT HAVE A STEADY PLACE TO SLEEP OR SLEPT IN A SHELTER (INCLUDING NOW)?: NO

## 2023-04-04 SDOH — ECONOMIC STABILITY: INCOME INSECURITY: HOW HARD IS IT FOR YOU TO PAY FOR THE VERY BASICS LIKE FOOD, HOUSING, MEDICAL CARE, AND HEATING?: NOT HARD AT ALL

## 2023-04-04 SDOH — ECONOMIC STABILITY: FOOD INSECURITY: WITHIN THE PAST 12 MONTHS, YOU WORRIED THAT YOUR FOOD WOULD RUN OUT BEFORE YOU GOT MONEY TO BUY MORE.: NEVER TRUE

## 2023-04-04 ASSESSMENT — ENCOUNTER SYMPTOMS
EYE REDNESS: 0
SHORTNESS OF BREATH: 0
WHEEZING: 0
SORE THROAT: 0
TROUBLE SWALLOWING: 0
ABDOMINAL PAIN: 0
COLOR CHANGE: 0
VOMITING: 0
SINUS PAIN: 0
PHOTOPHOBIA: 0
EYE DISCHARGE: 0
RECTAL PAIN: 0
EYE ITCHING: 0
NAUSEA: 0
DIARRHEA: 0
ROS SKIN COMMENTS: HAIR LOSS
RHINORRHEA: 0
FACIAL SWELLING: 0
VOICE CHANGE: 0
EYE PAIN: 0
COUGH: 0
ABDOMINAL DISTENTION: 0
CONSTIPATION: 0
SINUS PRESSURE: 0
BLOOD IN STOOL: 0
APNEA: 0
CHEST TIGHTNESS: 0
BACK PAIN: 0

## 2023-04-04 NOTE — PROGRESS NOTES
Family History   Problem Relation Age of Onset    Osteoarthritis Mother     Migraines Mother     Heart Failure Father     Osteoarthritis Father     Heart Disease Father          Review of Systems   Constitutional:  Negative for chills, diaphoresis, fatigue and fever. HENT:  Negative for congestion, dental problem, drooling, ear discharge, ear pain, facial swelling, hearing loss, mouth sores, nosebleeds, postnasal drip, rhinorrhea, sinus pressure, sinus pain, sneezing, sore throat, tinnitus, trouble swallowing and voice change. Eyes:  Negative for photophobia, pain, discharge, redness, itching and visual disturbance. Respiratory:  Negative for apnea, cough, chest tightness, shortness of breath and wheezing. Cardiovascular:  Negative for chest pain, palpitations and leg swelling. Gastrointestinal:  Negative for abdominal distention, abdominal pain, blood in stool, constipation, diarrhea, nausea, rectal pain and vomiting. Endocrine: Negative for cold intolerance, heat intolerance, polydipsia, polyphagia and polyuria. Genitourinary:  Negative for decreased urine volume, difficulty urinating, dysuria, flank pain, frequency, genital sores, hematuria and urgency. Musculoskeletal:  Negative for arthralgias, back pain, gait problem, joint swelling, myalgias, neck pain and neck stiffness. Skin:  Negative for color change, rash and wound. Hair loss   Allergic/Immunologic: Negative for environmental allergies and food allergies. Neurological:  Negative for dizziness, tremors, seizures, syncope, facial asymmetry, speech difficulty, weakness, light-headedness, numbness and headaches. Hematological:  Negative for adenopathy. Does not bruise/bleed easily. Psychiatric/Behavioral:  Negative for agitation, confusion, decreased concentration, hallucinations, self-injury, sleep disturbance and suicidal ideas. The patient is not nervous/anxious.         Objective:   /80   Pulse 81   Resp 14

## 2023-04-06 DIAGNOSIS — E03.8 SUBCLINICAL HYPOTHYROIDISM: ICD-10-CM

## 2023-04-06 LAB
ALBUMIN SERPL-MCNC: 4.5 G/DL (ref 3.5–4.6)
ALP SERPL-CCNC: 54 U/L (ref 40–130)
ALT SERPL-CCNC: 26 U/L (ref 0–33)
ANION GAP SERPL CALCULATED.3IONS-SCNC: 11 MEQ/L (ref 9–15)
AST SERPL-CCNC: 23 U/L (ref 0–35)
BASOPHILS # BLD: 0 K/UL (ref 0–0.2)
BASOPHILS NFR BLD: 1 %
BILIRUB SERPL-MCNC: 0.4 MG/DL (ref 0.2–0.7)
BUN SERPL-MCNC: 13 MG/DL (ref 8–23)
CALCIUM SERPL-MCNC: 10 MG/DL (ref 8.5–9.9)
CHLORIDE SERPL-SCNC: 105 MEQ/L (ref 95–107)
CHOLEST SERPL-MCNC: 272 MG/DL (ref 0–199)
CO2 SERPL-SCNC: 26 MEQ/L (ref 20–31)
CREAT SERPL-MCNC: 0.88 MG/DL (ref 0.5–0.9)
EOSINOPHIL # BLD: 0.2 K/UL (ref 0–0.7)
EOSINOPHIL NFR BLD: 6 %
ERYTHROCYTE [DISTWIDTH] IN BLOOD BY AUTOMATED COUNT: 13.8 % (ref 11.5–14.5)
GLOBULIN SER CALC-MCNC: 2.6 G/DL (ref 2.3–3.5)
GLUCOSE SERPL-MCNC: 103 MG/DL (ref 70–99)
HCT VFR BLD AUTO: 41.7 % (ref 37–47)
HDLC SERPL-MCNC: 89 MG/DL (ref 40–59)
HGB BLD-MCNC: 14.2 G/DL (ref 12–16)
LDLC SERPL CALC-MCNC: 160 MG/DL (ref 0–129)
LYMPHOCYTES # BLD: 1.2 K/UL (ref 1–4.8)
LYMPHOCYTES NFR BLD: 30 %
MCH RBC QN AUTO: 31.2 PG (ref 27–31.3)
MCHC RBC AUTO-ENTMCNC: 34.1 % (ref 33–37)
MCV RBC AUTO: 91.4 FL (ref 79.4–94.8)
MONOCYTES # BLD: 0.2 K/UL (ref 0.2–0.8)
MONOCYTES NFR BLD: 4.8 %
NEUTROPHILS # BLD: 2.3 K/UL (ref 1.4–6.5)
NEUTS SEG NFR BLD: 58 %
OVALOCYTES BLD QL SMEAR: ABNORMAL
PLATELET # BLD AUTO: 238 K/UL (ref 130–400)
PLATELET BLD QL SMEAR: NORMAL
POLYCHROMASIA BLD QL SMEAR: ABNORMAL
POTASSIUM SERPL-SCNC: 5 MEQ/L (ref 3.4–4.9)
PROT SERPL-MCNC: 7.1 G/DL (ref 6.3–8)
RBC # BLD AUTO: 4.56 M/UL (ref 4.2–5.4)
SODIUM SERPL-SCNC: 142 MEQ/L (ref 135–144)
TRIGL SERPL-MCNC: 117 MG/DL (ref 0–150)
TSH SERPL-MCNC: 3.35 UIU/ML (ref 0.44–3.86)
VARIANT LYMPHS NFR BLD: 1 %
WBC # BLD AUTO: 3.9 K/UL (ref 4.8–10.8)

## 2023-04-07 LAB — VITAMIN D 25-HYDROXY: 68.4 NG/ML

## 2023-04-08 LAB — T4 TOTAL: 6.5 UG/DL (ref 4.5–10.9)

## 2023-07-05 ENCOUNTER — OFFICE VISIT (OUTPATIENT)
Dept: FAMILY MEDICINE CLINIC | Age: 68
End: 2023-07-05
Payer: MEDICARE

## 2023-07-05 VITALS
OXYGEN SATURATION: 98 % | HEIGHT: 69 IN | WEIGHT: 168 LBS | HEART RATE: 88 BPM | BODY MASS INDEX: 24.88 KG/M2 | RESPIRATION RATE: 16 BRPM | DIASTOLIC BLOOD PRESSURE: 70 MMHG | SYSTOLIC BLOOD PRESSURE: 118 MMHG

## 2023-07-05 DIAGNOSIS — E78.1 HYPERTRIGLYCERIDEMIA: ICD-10-CM

## 2023-07-05 DIAGNOSIS — E03.8 SUBCLINICAL HYPOTHYROIDISM: Primary | ICD-10-CM

## 2023-07-05 DIAGNOSIS — E78.5 HYPERLIPIDEMIA, UNSPECIFIED HYPERLIPIDEMIA TYPE: ICD-10-CM

## 2023-07-05 PROCEDURE — 3017F COLORECTAL CA SCREEN DOC REV: CPT | Performed by: INTERNAL MEDICINE

## 2023-07-05 PROCEDURE — G8400 PT W/DXA NO RESULTS DOC: HCPCS | Performed by: INTERNAL MEDICINE

## 2023-07-05 PROCEDURE — G8420 CALC BMI NORM PARAMETERS: HCPCS | Performed by: INTERNAL MEDICINE

## 2023-07-05 PROCEDURE — G8427 DOCREV CUR MEDS BY ELIG CLIN: HCPCS | Performed by: INTERNAL MEDICINE

## 2023-07-05 PROCEDURE — 1036F TOBACCO NON-USER: CPT | Performed by: INTERNAL MEDICINE

## 2023-07-05 PROCEDURE — 1090F PRES/ABSN URINE INCON ASSESS: CPT | Performed by: INTERNAL MEDICINE

## 2023-07-05 PROCEDURE — 1123F ACP DISCUSS/DSCN MKR DOCD: CPT | Performed by: INTERNAL MEDICINE

## 2023-07-05 PROCEDURE — 99213 OFFICE O/P EST LOW 20 MIN: CPT | Performed by: INTERNAL MEDICINE

## 2023-07-05 ASSESSMENT — ENCOUNTER SYMPTOMS
NAUSEA: 0
ABDOMINAL PAIN: 0
BLOOD IN STOOL: 0
CONSTIPATION: 0
EYE PAIN: 0
EYE ITCHING: 0
EYE DISCHARGE: 0
ABDOMINAL DISTENTION: 0
SHORTNESS OF BREATH: 0
COUGH: 0
ROS SKIN COMMENTS: HAIR LOSS
APNEA: 0
SINUS PAIN: 0
VOICE CHANGE: 0
DIARRHEA: 0
COLOR CHANGE: 0
PHOTOPHOBIA: 0
SORE THROAT: 0
WHEEZING: 0
SINUS PRESSURE: 0
VOMITING: 0
TROUBLE SWALLOWING: 0
FACIAL SWELLING: 0
RHINORRHEA: 0
BACK PAIN: 0
CHEST TIGHTNESS: 0
RECTAL PAIN: 0
EYE REDNESS: 0

## 2023-07-05 NOTE — PROGRESS NOTES
Subjective:      Patient ID: Rolanda Noonan is a 75 y.o. female Established patient, here for evaluation of the following chief complaint(s):  Chief Complaint   Patient presents with    Hypothyroidism       HPI  79-year-old female with history of elevated TSH and hyperlipidemia presentsfor a f/u visit. She reports that she is losing hair. This has been going on for several weeks. Hair loss      The 10-year ASCVD risk score (Edy GUAMAN, et al., 2019) is: 6.5%    Values used to calculate the score:      Age: 75 years      Sex: Female      Is Non- : No      Diabetic: No      Tobacco smoker: No      Systolic Blood Pressure: 299 mmHg      Is BP treated: No      HDL Cholesterol: 89 mg/dL      Total Cholesterol: 272 mg/dL      At present he denies polyuria,  Polydipsia, constitutional, sinus, visual, cardiopulmonary, urologic, gastrointestinal, immunologic/hematologic, musculoskeletal, neurologic,dermatologic, or psychiatric complaints.     Current Outpatient Medications on File Prior to Visit   Medication Sig Dispense Refill    estradiol (VIVELLE) 0.1 MG/24HR APPLY 1 PATCH ON THE SKIN once A WEEK 12 patch 0    ascorbic acid (VITAMIN C) 100 MG tablet Take 100 mg by mouth daily      omega-3 acid ethyl esters (LOVAZA) 1 g capsule Take 2 capsules by mouth 2 times daily 360 capsule 3    Magnesium Malate 1250 (141.7 Mg) MG TABS Take 2,500 mg by mouth once       Methylsulfonylmethane (MSM) 1000 MG CAPS Take 2,000 mg by mouth once       b complex vitamins capsule Take 1 capsule by mouth 2 times daily      vitamin B-12 (CYANOCOBALAMIN) 500 MCG tablet Take 5,000 mcg by mouth daily      Red Yeast Rice 600 MG TABS Take 1,200 mg by mouth once       Cholecalciferol (VITAMIN D3) 2000 units CAPS Take 5,000 Units by mouth daily       Probiotic Product (PROBIOTIC ADVANCED PO) Take 540 mg by mouth daily      folic acid (FOLVITE) 586 MCG tablet Take 800 mcg by mouth daily      TURMERIC PO Take 900 mg by mouth

## 2023-08-22 DIAGNOSIS — N95.1 MENOPAUSAL SYMPTOMS: ICD-10-CM

## 2023-08-23 RX ORDER — ESTRADIOL 0.1 MG/D
FILM, EXTENDED RELEASE TRANSDERMAL
Qty: 8 PATCH | Refills: 5 | Status: SHIPPED | OUTPATIENT
Start: 2023-08-23

## 2023-10-13 ENCOUNTER — HOSPITAL ENCOUNTER (OUTPATIENT)
Dept: WOMENS IMAGING | Age: 68
End: 2023-10-13
Payer: MEDICARE

## 2023-10-13 VITALS — BODY MASS INDEX: 22.36 KG/M2 | HEIGHT: 69 IN | WEIGHT: 151 LBS

## 2023-10-13 DIAGNOSIS — Z12.31 ENCOUNTER FOR SCREENING MAMMOGRAM FOR BREAST CANCER: ICD-10-CM

## 2023-10-13 PROCEDURE — 77063 BREAST TOMOSYNTHESIS BI: CPT

## 2023-10-26 RX ORDER — OMEGA-3-ACID ETHYL ESTERS 1 G/1
2 CAPSULE, LIQUID FILLED ORAL 2 TIMES DAILY
Qty: 360 CAPSULE | Refills: 3 | Status: SHIPPED | OUTPATIENT
Start: 2023-10-26

## 2024-01-03 ASSESSMENT — ENCOUNTER SYMPTOMS
VOICE CHANGE: 0
SINUS PRESSURE: 0
ABDOMINAL PAIN: 0
ABDOMINAL DISTENTION: 0
FACIAL SWELLING: 0
VOMITING: 0
DIARRHEA: 0
EYE ITCHING: 0
EYE REDNESS: 0
TROUBLE SWALLOWING: 0
CONSTIPATION: 0
SINUS PAIN: 0
COUGH: 0
RHINORRHEA: 0
EYE PAIN: 0
BACK PAIN: 0
APNEA: 0
PHOTOPHOBIA: 0
CHEST TIGHTNESS: 0
WHEEZING: 0
COLOR CHANGE: 0
SORE THROAT: 0
RECTAL PAIN: 0
NAUSEA: 0
ROS SKIN COMMENTS: HAIR LOSS
SHORTNESS OF BREATH: 0
BLOOD IN STOOL: 0
EYE DISCHARGE: 0

## 2024-01-03 NOTE — PROGRESS NOTES
Subjective:      Patient ID: Tessie Danielson is a 68 y.o. female Established patient, here for evaluation of the following chief complaint(s):  Chief Complaint   Patient presents with    Hypothyroidism       HPI  67-year-old female with history of elevated TSH and hyperlipidemia presentsfor a f/u visit.         Hair loss: The patient's hair loss is stable at this time.        Hyperlipidemia: The patient's lipid panel on April 6, 2023 with revealed hyperlipidemia.  The results are noted below:    Component      Latest Ref Rng 4/6/2023   Cholesterol, Total      0 - 199 mg/dL 272 (H)    Triglycerides      0 - 150 mg/dL 117    HDL Cholesterol      40 - 59 mg/dL 89 (H)    LDL Calculated      0 - 129 mg/dL 160 (H)       Legend:  (H) High      The 10-year ASCVD risk score (Edy GUAMAN, et al., 2019) is: 6.6%    Values used to calculate the score:      Age: 68 years      Sex: Female      Is Non- : No      Diabetic: No      Tobacco smoker: No      Systolic Blood Pressure: 112 mmHg      Is BP treated: No      HDL Cholesterol: 89 mg/dL      Total Cholesterol: 272 mg/dL    Leukopenia: Patient's most recent CBC revealed leukopenia. Wbc=3.9      At present he denies polyuria,  Polydipsia, constitutional, sinus, visual, cardiopulmonary, urologic, gastrointestinal, immunologic/hematologic, musculoskeletal, neurologic,dermatologic, or psychiatric complaints.    Current Outpatient Medications on File Prior to Visit   Medication Sig Dispense Refill    omega-3 acid ethyl esters (LOVAZA) 1 g capsule TAKE 2 CAPSULES BY MOUTH TWICE  DAILY 360 capsule 3    estradiol (RACHANA) 0.1 MG/24HR APPLY 1 PATCH TOPICALLY TO SKIN  ONCE A WEEK 8 patch 5    ascorbic acid (VITAMIN C) 100 MG tablet Take 100 mg by mouth daily      Magnesium Malate 1250 (141.7 Mg) MG TABS Take 2,500 mg by mouth once       Methylsulfonylmethane (MSM) 1000 MG CAPS Take 2,000 mg by mouth once       b complex vitamins capsule Take 1 capsule by mouth 2 times

## 2024-01-04 ENCOUNTER — OFFICE VISIT (OUTPATIENT)
Dept: FAMILY MEDICINE CLINIC | Age: 69
End: 2024-01-04
Payer: MEDICARE

## 2024-01-04 VITALS
RESPIRATION RATE: 14 BRPM | OXYGEN SATURATION: 97 % | BODY MASS INDEX: 23.85 KG/M2 | HEIGHT: 69 IN | SYSTOLIC BLOOD PRESSURE: 112 MMHG | WEIGHT: 161 LBS | DIASTOLIC BLOOD PRESSURE: 70 MMHG | HEART RATE: 87 BPM

## 2024-01-04 VITALS
DIASTOLIC BLOOD PRESSURE: 70 MMHG | BODY MASS INDEX: 23.85 KG/M2 | SYSTOLIC BLOOD PRESSURE: 112 MMHG | HEIGHT: 69 IN | RESPIRATION RATE: 14 BRPM | OXYGEN SATURATION: 97 % | WEIGHT: 161 LBS | HEART RATE: 87 BPM

## 2024-01-04 DIAGNOSIS — D72.819 LEUKOPENIA, UNSPECIFIED TYPE: ICD-10-CM

## 2024-01-04 DIAGNOSIS — E78.5 HYPERLIPIDEMIA, UNSPECIFIED HYPERLIPIDEMIA TYPE: Primary | ICD-10-CM

## 2024-01-04 DIAGNOSIS — E03.8 SUBCLINICAL HYPOTHYROIDISM: ICD-10-CM

## 2024-01-04 DIAGNOSIS — Z00.00 MEDICARE ANNUAL WELLNESS VISIT, SUBSEQUENT: Primary | ICD-10-CM

## 2024-01-04 PROCEDURE — G8427 DOCREV CUR MEDS BY ELIG CLIN: HCPCS | Performed by: INTERNAL MEDICINE

## 2024-01-04 PROCEDURE — 1090F PRES/ABSN URINE INCON ASSESS: CPT | Performed by: INTERNAL MEDICINE

## 2024-01-04 PROCEDURE — 99214 OFFICE O/P EST MOD 30 MIN: CPT | Performed by: INTERNAL MEDICINE

## 2024-01-04 PROCEDURE — G0439 PPPS, SUBSEQ VISIT: HCPCS | Performed by: INTERNAL MEDICINE

## 2024-01-04 PROCEDURE — 1123F ACP DISCUSS/DSCN MKR DOCD: CPT | Performed by: INTERNAL MEDICINE

## 2024-01-04 PROCEDURE — G8484 FLU IMMUNIZE NO ADMIN: HCPCS | Performed by: INTERNAL MEDICINE

## 2024-01-04 PROCEDURE — 1036F TOBACCO NON-USER: CPT | Performed by: INTERNAL MEDICINE

## 2024-01-04 PROCEDURE — G8420 CALC BMI NORM PARAMETERS: HCPCS | Performed by: INTERNAL MEDICINE

## 2024-01-04 PROCEDURE — G8400 PT W/DXA NO RESULTS DOC: HCPCS | Performed by: INTERNAL MEDICINE

## 2024-01-04 PROCEDURE — 3017F COLORECTAL CA SCREEN DOC REV: CPT | Performed by: INTERNAL MEDICINE

## 2024-01-04 ASSESSMENT — PATIENT HEALTH QUESTIONNAIRE - PHQ9
1. LITTLE INTEREST OR PLEASURE IN DOING THINGS: 0
SUM OF ALL RESPONSES TO PHQ QUESTIONS 1-9: 0
SUM OF ALL RESPONSES TO PHQ QUESTIONS 1-9: 0
2. FEELING DOWN, DEPRESSED OR HOPELESS: 0
SUM OF ALL RESPONSES TO PHQ9 QUESTIONS 1 & 2: 0
SUM OF ALL RESPONSES TO PHQ QUESTIONS 1-9: 0
SUM OF ALL RESPONSES TO PHQ QUESTIONS 1-9: 0

## 2024-01-04 NOTE — PATIENT INSTRUCTIONS
2931-1128 mg of calcium and 0614-4035 IU of vitamin D per day. It is possible to meet your calcium requirement with diet alone, but a vitamin D supplement is usually necessary to meet this goal.  When exposed to the sun, use a sunscreen that protects against both UVA and UVB radiation with an SPF of 30 or greater. Reapply every 2 to 3 hours or after sweating, drying off with a towel, or swimming.  Always wear a seat belt when traveling in a car. Always wear a helmet when riding a bicycle or motorcycle.

## 2024-01-04 NOTE — PROGRESS NOTES
Medicare Annual Wellness Visit    Tessie Danielson is here for Medicare AWV    Assessment & Plan   Medicare annual wellness visit, subsequent  Recommendations for Preventive Services Due: see orders and patient instructions/AVS.  Recommended screening schedule for the next 5-10 years is provided to the patient in written form: see Patient Instructions/AVS.     No follow-ups on file.     Subjective   The patient doing well at this time.  Patient's complete Health Risk Assessment and screening values have been reviewed and are found in Flowsheets. The following problems were reviewed today and where indicated follow up appointments were made and/or referrals ordered.    Positive Risk Factor Screenings with Interventions:                   Vision Screen:        No results found.          Advanced Directives:                 Objective   Vitals:    01/04/24 1344   BP: 112/70   Pulse: 87   Resp: 14   SpO2: 97%   Weight: 73 kg (161 lb)   Height: 1.753 m (5' 9\")      Body mass index is 23.78 kg/m².        Physical Exam  Constitutional:       General: She is not in acute distress.     Appearance: She is well-developed.   HENT:      Head: Normocephalic.      Right Ear: External ear normal.      Left Ear: External ear normal.   Eyes:      Conjunctiva/sclera: Conjunctivae normal.   Neck:      Vascular: No JVD.      Trachea: No tracheal deviation.   Cardiovascular:      Rate and Rhythm: Normal rate and regular rhythm.      Heart sounds: Normal heart sounds.   Pulmonary:      Effort: Pulmonary effort is normal. No respiratory distress.      Breath sounds: Normal breath sounds. No wheezing or rales.   Chest:      Chest wall: No tenderness.   Abdominal:      General: Bowel sounds are normal. There is no distension.      Palpations: Abdomen is soft. There is no mass.      Tenderness: There is no abdominal tenderness. There is no guarding or rebound.   Musculoskeletal:         General: No tenderness or deformity.      Cervical back:

## 2024-05-12 DIAGNOSIS — N95.1 MENOPAUSAL SYMPTOMS: ICD-10-CM

## 2024-05-13 RX ORDER — ESTRADIOL 0.1 MG/D
FILM, EXTENDED RELEASE TRANSDERMAL
Qty: 8 PATCH | Refills: 5 | Status: SHIPPED | OUTPATIENT
Start: 2024-05-13

## 2024-05-13 NOTE — TELEPHONE ENCOUNTER
Please approve or deny request. Thank you!    Rx requested:  Requested Prescriptions     Pending Prescriptions Disp Refills    estradiol (RACHANA) 0.1 MG/24HR [Pharmacy Med Name: RACHANA PATCH BIW 0.1MG] 8 patch 5     Sig: APPLY 1 PATCH TOPICALLY TO SKIN  WEEKLY         Last Office Visit:   1/4/2024      Next Visit Date:  Future Appointments   Date Time Provider Department Center   6/5/2024  2:15 PM Anton Guerra MD [facility]

## 2024-05-28 DIAGNOSIS — D72.819 LEUKOPENIA, UNSPECIFIED TYPE: ICD-10-CM

## 2024-05-28 DIAGNOSIS — E78.5 HYPERLIPIDEMIA, UNSPECIFIED HYPERLIPIDEMIA TYPE: ICD-10-CM

## 2024-05-28 DIAGNOSIS — E03.8 SUBCLINICAL HYPOTHYROIDISM: ICD-10-CM

## 2024-05-28 LAB
ALBUMIN SERPL-MCNC: 4.5 G/DL (ref 3.5–4.6)
ALP SERPL-CCNC: 50 U/L (ref 40–130)
ALT SERPL-CCNC: 20 U/L (ref 0–33)
ANION GAP SERPL CALCULATED.3IONS-SCNC: 12 MEQ/L (ref 9–15)
AST SERPL-CCNC: 24 U/L (ref 0–35)
BASOPHILS # BLD: 0 K/UL (ref 0–0.2)
BASOPHILS NFR BLD: 0.7 %
BILIRUB SERPL-MCNC: 0.5 MG/DL (ref 0.2–0.7)
BUN SERPL-MCNC: 7 MG/DL (ref 8–23)
CALCIUM SERPL-MCNC: 9.4 MG/DL (ref 8.5–9.9)
CHLORIDE SERPL-SCNC: 106 MEQ/L (ref 95–107)
CHOLEST SERPL-MCNC: 297 MG/DL (ref 0–199)
CO2 SERPL-SCNC: 25 MEQ/L (ref 20–31)
CREAT SERPL-MCNC: 0.82 MG/DL (ref 0.5–0.9)
EOSINOPHIL # BLD: 0.1 K/UL (ref 0–0.7)
EOSINOPHIL NFR BLD: 3.5 %
ERYTHROCYTE [DISTWIDTH] IN BLOOD BY AUTOMATED COUNT: 13.8 % (ref 11.5–14.5)
GLOBULIN SER CALC-MCNC: 2.7 G/DL (ref 2.3–3.5)
GLUCOSE SERPL-MCNC: 97 MG/DL (ref 70–99)
HCT VFR BLD AUTO: 41.2 % (ref 37–47)
HDLC SERPL-MCNC: 85 MG/DL (ref 40–59)
HGB BLD-MCNC: 13.8 G/DL (ref 12–16)
LDL CHOLESTEROL: 194 MG/DL (ref 0–129)
LYMPHOCYTES # BLD: 1.3 K/UL (ref 1–4.8)
LYMPHOCYTES NFR BLD: 31.8 %
MCH RBC QN AUTO: 30.1 PG (ref 27–31.3)
MCHC RBC AUTO-ENTMCNC: 33.5 % (ref 33–37)
MCV RBC AUTO: 89.8 FL (ref 79.4–94.8)
MONOCYTES # BLD: 0.3 K/UL (ref 0.2–0.8)
MONOCYTES NFR BLD: 8.2 %
NEUTROPHILS # BLD: 2.2 K/UL (ref 1.4–6.5)
NEUTS SEG NFR BLD: 55.6 %
PLATELET # BLD AUTO: 228 K/UL (ref 130–400)
POTASSIUM SERPL-SCNC: 4.9 MEQ/L (ref 3.4–4.9)
PROT SERPL-MCNC: 7.2 G/DL (ref 6.3–8)
RBC # BLD AUTO: 4.59 M/UL (ref 4.2–5.4)
SODIUM SERPL-SCNC: 143 MEQ/L (ref 135–144)
TRIGLYCERIDE, FASTING: 90 MG/DL (ref 0–150)
TSH SERPL-MCNC: 3.81 UIU/ML (ref 0.44–3.86)
WBC # BLD AUTO: 4 K/UL (ref 4.8–10.8)

## 2024-05-29 LAB — T4 TOTAL: 6.6 UG/DL (ref 4.5–11.7)

## 2024-06-02 SDOH — ECONOMIC STABILITY: FOOD INSECURITY: WITHIN THE PAST 12 MONTHS, THE FOOD YOU BOUGHT JUST DIDN'T LAST AND YOU DIDN'T HAVE MONEY TO GET MORE.: PATIENT DECLINED

## 2024-06-02 SDOH — ECONOMIC STABILITY: TRANSPORTATION INSECURITY
IN THE PAST 12 MONTHS, HAS LACK OF TRANSPORTATION KEPT YOU FROM MEETINGS, WORK, OR FROM GETTING THINGS NEEDED FOR DAILY LIVING?: PATIENT DECLINED

## 2024-06-02 SDOH — ECONOMIC STABILITY: HOUSING INSECURITY
IN THE LAST 12 MONTHS, WAS THERE A TIME WHEN YOU DID NOT HAVE A STEADY PLACE TO SLEEP OR SLEPT IN A SHELTER (INCLUDING NOW)?: PATIENT DECLINED

## 2024-06-02 SDOH — ECONOMIC STABILITY: INCOME INSECURITY: HOW HARD IS IT FOR YOU TO PAY FOR THE VERY BASICS LIKE FOOD, HOUSING, MEDICAL CARE, AND HEATING?: PATIENT DECLINED

## 2024-06-02 SDOH — ECONOMIC STABILITY: FOOD INSECURITY: WITHIN THE PAST 12 MONTHS, YOU WORRIED THAT YOUR FOOD WOULD RUN OUT BEFORE YOU GOT MONEY TO BUY MORE.: PATIENT DECLINED

## 2024-06-05 ENCOUNTER — OFFICE VISIT (OUTPATIENT)
Dept: FAMILY MEDICINE CLINIC | Age: 69
End: 2024-06-05
Payer: MEDICARE

## 2024-06-05 VITALS
SYSTOLIC BLOOD PRESSURE: 114 MMHG | HEART RATE: 67 BPM | DIASTOLIC BLOOD PRESSURE: 70 MMHG | BODY MASS INDEX: 23.25 KG/M2 | HEIGHT: 69 IN | OXYGEN SATURATION: 95 % | WEIGHT: 157 LBS | RESPIRATION RATE: 16 BRPM

## 2024-06-05 DIAGNOSIS — E78.5 HYPERLIPIDEMIA, UNSPECIFIED HYPERLIPIDEMIA TYPE: Primary | ICD-10-CM

## 2024-06-05 PROCEDURE — 99213 OFFICE O/P EST LOW 20 MIN: CPT | Performed by: INTERNAL MEDICINE

## 2024-06-05 PROCEDURE — G8420 CALC BMI NORM PARAMETERS: HCPCS | Performed by: INTERNAL MEDICINE

## 2024-06-05 PROCEDURE — 3017F COLORECTAL CA SCREEN DOC REV: CPT | Performed by: INTERNAL MEDICINE

## 2024-06-05 PROCEDURE — 1036F TOBACCO NON-USER: CPT | Performed by: INTERNAL MEDICINE

## 2024-06-05 PROCEDURE — G8427 DOCREV CUR MEDS BY ELIG CLIN: HCPCS | Performed by: INTERNAL MEDICINE

## 2024-06-05 PROCEDURE — G8400 PT W/DXA NO RESULTS DOC: HCPCS | Performed by: INTERNAL MEDICINE

## 2024-06-05 PROCEDURE — 1090F PRES/ABSN URINE INCON ASSESS: CPT | Performed by: INTERNAL MEDICINE

## 2024-06-05 PROCEDURE — 1123F ACP DISCUSS/DSCN MKR DOCD: CPT | Performed by: INTERNAL MEDICINE

## 2024-06-05 ASSESSMENT — ENCOUNTER SYMPTOMS
BACK PAIN: 0
EYE DISCHARGE: 0
CONSTIPATION: 0
EYE REDNESS: 0
SINUS PRESSURE: 0
COUGH: 0
ROS SKIN COMMENTS: HAIR LOSS
BLOOD IN STOOL: 0
APNEA: 0
PHOTOPHOBIA: 0
EYE PAIN: 0
ABDOMINAL PAIN: 0
NAUSEA: 0
FACIAL SWELLING: 0
RHINORRHEA: 0
ABDOMINAL DISTENTION: 0
WHEEZING: 0
SORE THROAT: 0
EYE ITCHING: 0
CHEST TIGHTNESS: 0
COLOR CHANGE: 0
VOICE CHANGE: 0
VOMITING: 0
SHORTNESS OF BREATH: 0
TROUBLE SWALLOWING: 0
DIARRHEA: 0
RECTAL PAIN: 0
SINUS PAIN: 0

## 2024-06-05 NOTE — PROGRESS NOTES
Subjective:      Patient ID: Tessie Danielson is a 68 y.o. female Established patient, here for evaluation of the following chief complaint(s):  Chief Complaint   Patient presents with    Hyperlipidemia       HPI  67-year-old female with history of elevated TSH and hyperlipidemia presentsfor a f/u visit.           Hyperlipidemia: The patient's lipid panel on April 6, 2023 with revealed hyperlipidemia.  The results are noted below:        Component      Latest Ref Rng 5/28/2024   Cholesterol, Fasting      0 - 199 mg/dL 297 (H)    Triglyceride, Fasting      0 - 150 mg/dL 90    HDL Cholesterol      40 - 59 mg/dL 85 (H)    LDL Cholesterol      0 - 129 mg/dL 194 (H)      The 10-year ASCVD risk score (Edy GUAMAN, et al., 2019) is: 7.1%    Values used to calculate the score:      Age: 68 years      Sex: Female      Is Non- : No      Diabetic: No      Tobacco smoker: No      Systolic Blood Pressure: 114 mmHg      Is BP treated: No      HDL Cholesterol: 85 mg/dL      Total Cholesterol: 297 mg/dL    Leukopenia: Patient's most recent CBC revealed leukopenia. Wbc=3.9      At present he denies polyuria,  Polydipsia, constitutional, sinus, visual, cardiopulmonary, urologic, gastrointestinal, immunologic/hematologic, musculoskeletal, neurologic,dermatologic, or psychiatric complaints.    Current Outpatient Medications on File Prior to Visit   Medication Sig Dispense Refill    omega-3 acid ethyl esters (LOVAZA) 1 g capsule TAKE 2 CAPSULES BY MOUTH TWICE  DAILY 360 capsule 3    estradiol (RACHANA) 0.1 MG/24HR APPLY 1 PATCH TOPICALLY TO SKIN  WEEKLY 8 patch 5    ascorbic acid (VITAMIN C) 100 MG tablet Take 100 mg by mouth daily      Magnesium Malate 1250 (141.7 Mg) MG TABS Take 2,500 mg by mouth once       Methylsulfonylmethane (MSM) 1000 MG CAPS Take 2,000 mg by mouth once       b complex vitamins capsule Take 1 capsule by mouth 2 times daily      vitamin B-12 (CYANOCOBALAMIN) 500 MCG tablet Take 5,000 mcg by

## 2024-08-21 RX ORDER — OMEGA-3-ACID ETHYL ESTERS 1 G/1
2 CAPSULE, LIQUID FILLED ORAL 2 TIMES DAILY
Qty: 360 CAPSULE | Refills: 3 | Status: SHIPPED | OUTPATIENT
Start: 2024-08-21

## 2024-08-21 NOTE — TELEPHONE ENCOUNTER
Future Appointments    Encounter Information   Provider Department Appt Notes   12/5/2024 Anton Guerra MD [facility] 6 mo f/u     Past Visits    Date Provider Specialty Visit Type Primary Dx   06/05/2024 Anton Guerra MD Family Medicine Office Visit Hyperlipidemia, unspecified hyperlipidemia type

## 2024-11-24 NOTE — PROGRESS NOTES
decreased from 297 to 291.       Assessment & Plan  1. Hypercholesterolemia.  Her blood pressure and pulse are within normal limits. There has been a slight decrease in her total cholesterol and LDL levels, while her HDL level has increased. Her cardiovascular risk score is nearing the target of 7.5. Her body mass index is within the normal range. She was advised to incorporate aerobic exercise into her routine and to consume a few glasses of wine weekly. She is currently taking omega-3 fatty acids. A follow-up cholesterol test will be conducted in 6 months.    Follow-up  Return in 6 months for follow up.     Return in about 3 months (around 3/5/2025).    The patient (or guardian, if applicable) and other individuals in attendance with the patient were advised that Artificial Intelligence will be utilized during this visit to record, process the conversation to generate a clinical note and to support improvement of the AI technology. The patient (or guardian, if applicable) and other individuals in attendance at the appointment consented to the use of AI, including the recording.      An electronic signature was used to authenticate this note.    --Anton Guerra MD

## 2024-11-26 DIAGNOSIS — E78.5 HYPERLIPIDEMIA, UNSPECIFIED HYPERLIPIDEMIA TYPE: ICD-10-CM

## 2024-11-26 LAB
CHOLEST SERPL-MCNC: 291 MG/DL (ref 0–199)
HDLC SERPL-MCNC: 92 MG/DL (ref 40–59)
LDL CHOLESTEROL: 180 MG/DL (ref 0–129)
TRIGLYCERIDE, FASTING: 94 MG/DL (ref 0–150)

## 2024-12-05 ENCOUNTER — OFFICE VISIT (OUTPATIENT)
Dept: FAMILY MEDICINE CLINIC | Age: 69
End: 2024-12-05
Payer: MEDICARE

## 2024-12-05 VITALS
WEIGHT: 160 LBS | HEIGHT: 69 IN | HEART RATE: 79 BPM | OXYGEN SATURATION: 99 % | SYSTOLIC BLOOD PRESSURE: 106 MMHG | DIASTOLIC BLOOD PRESSURE: 80 MMHG | BODY MASS INDEX: 23.7 KG/M2 | RESPIRATION RATE: 14 BRPM

## 2024-12-05 DIAGNOSIS — E78.5 HYPERLIPIDEMIA, UNSPECIFIED HYPERLIPIDEMIA TYPE: Primary | ICD-10-CM

## 2024-12-05 PROCEDURE — G8427 DOCREV CUR MEDS BY ELIG CLIN: HCPCS | Performed by: INTERNAL MEDICINE

## 2024-12-05 PROCEDURE — 3017F COLORECTAL CA SCREEN DOC REV: CPT | Performed by: INTERNAL MEDICINE

## 2024-12-05 PROCEDURE — 1159F MED LIST DOCD IN RCRD: CPT | Performed by: INTERNAL MEDICINE

## 2024-12-05 PROCEDURE — 1090F PRES/ABSN URINE INCON ASSESS: CPT | Performed by: INTERNAL MEDICINE

## 2024-12-05 PROCEDURE — G8420 CALC BMI NORM PARAMETERS: HCPCS | Performed by: INTERNAL MEDICINE

## 2024-12-05 PROCEDURE — 1123F ACP DISCUSS/DSCN MKR DOCD: CPT | Performed by: INTERNAL MEDICINE

## 2024-12-05 PROCEDURE — G8400 PT W/DXA NO RESULTS DOC: HCPCS | Performed by: INTERNAL MEDICINE

## 2024-12-05 PROCEDURE — G8484 FLU IMMUNIZE NO ADMIN: HCPCS | Performed by: INTERNAL MEDICINE

## 2024-12-05 PROCEDURE — 1036F TOBACCO NON-USER: CPT | Performed by: INTERNAL MEDICINE

## 2024-12-05 PROCEDURE — 99214 OFFICE O/P EST MOD 30 MIN: CPT | Performed by: INTERNAL MEDICINE

## 2024-12-05 ASSESSMENT — PATIENT HEALTH QUESTIONNAIRE - PHQ9
SUM OF ALL RESPONSES TO PHQ QUESTIONS 1-9: 0
SUM OF ALL RESPONSES TO PHQ9 QUESTIONS 1 & 2: 0
SUM OF ALL RESPONSES TO PHQ QUESTIONS 1-9: 0
2. FEELING DOWN, DEPRESSED OR HOPELESS: NOT AT ALL
1. LITTLE INTEREST OR PLEASURE IN DOING THINGS: NOT AT ALL

## 2025-02-16 DIAGNOSIS — N95.1 MENOPAUSAL SYMPTOMS: ICD-10-CM

## 2025-02-17 RX ORDER — ESTRADIOL 0.1 MG/D
FILM, EXTENDED RELEASE TRANSDERMAL
Qty: 8 PATCH | Refills: 5 | Status: SHIPPED | OUTPATIENT
Start: 2025-02-17

## 2025-06-05 ENCOUNTER — OFFICE VISIT (OUTPATIENT)
Age: 70
End: 2025-06-05
Payer: MEDICARE

## 2025-06-05 VITALS
HEIGHT: 69 IN | OXYGEN SATURATION: 97 % | BODY MASS INDEX: 23.7 KG/M2 | DIASTOLIC BLOOD PRESSURE: 80 MMHG | HEART RATE: 86 BPM | SYSTOLIC BLOOD PRESSURE: 120 MMHG | WEIGHT: 160 LBS | RESPIRATION RATE: 16 BRPM

## 2025-06-05 DIAGNOSIS — Z00.00 MEDICARE ANNUAL WELLNESS VISIT, SUBSEQUENT: ICD-10-CM

## 2025-06-05 DIAGNOSIS — E78.5 HYPERLIPIDEMIA, UNSPECIFIED HYPERLIPIDEMIA TYPE: Primary | ICD-10-CM

## 2025-06-05 DIAGNOSIS — E03.8 SUBCLINICAL HYPOTHYROIDISM: ICD-10-CM

## 2025-06-05 DIAGNOSIS — E78.1 HYPERTRIGLYCERIDEMIA: ICD-10-CM

## 2025-06-05 DIAGNOSIS — D72.819 LEUKOPENIA, UNSPECIFIED TYPE: ICD-10-CM

## 2025-06-05 PROCEDURE — 1036F TOBACCO NON-USER: CPT | Performed by: INTERNAL MEDICINE

## 2025-06-05 PROCEDURE — 1123F ACP DISCUSS/DSCN MKR DOCD: CPT | Performed by: INTERNAL MEDICINE

## 2025-06-05 PROCEDURE — 3017F COLORECTAL CA SCREEN DOC REV: CPT | Performed by: INTERNAL MEDICINE

## 2025-06-05 PROCEDURE — G8428 CUR MEDS NOT DOCUMENT: HCPCS | Performed by: INTERNAL MEDICINE

## 2025-06-05 PROCEDURE — G0439 PPPS, SUBSEQ VISIT: HCPCS | Performed by: INTERNAL MEDICINE

## 2025-06-05 PROCEDURE — G8420 CALC BMI NORM PARAMETERS: HCPCS | Performed by: INTERNAL MEDICINE

## 2025-06-05 PROCEDURE — 1090F PRES/ABSN URINE INCON ASSESS: CPT | Performed by: INTERNAL MEDICINE

## 2025-06-05 PROCEDURE — G8400 PT W/DXA NO RESULTS DOC: HCPCS | Performed by: INTERNAL MEDICINE

## 2025-06-05 PROCEDURE — 99213 OFFICE O/P EST LOW 20 MIN: CPT | Performed by: INTERNAL MEDICINE

## 2025-06-05 ASSESSMENT — LIFESTYLE VARIABLES
HOW MANY STANDARD DRINKS CONTAINING ALCOHOL DO YOU HAVE ON A TYPICAL DAY: PATIENT DOES NOT DRINK
HOW OFTEN DO YOU HAVE A DRINK CONTAINING ALCOHOL: NEVER

## 2025-06-05 ASSESSMENT — PATIENT HEALTH QUESTIONNAIRE - PHQ9
SUM OF ALL RESPONSES TO PHQ QUESTIONS 1-9: 0
1. LITTLE INTEREST OR PLEASURE IN DOING THINGS: NOT AT ALL
SUM OF ALL RESPONSES TO PHQ QUESTIONS 1-9: 0
2. FEELING DOWN, DEPRESSED OR HOPELESS: NOT AT ALL

## 2025-06-05 NOTE — PROGRESS NOTES
Tessie Danielson (:  1955) is a 69 y.o. female, Established patient, here for evaluation of the following chief complaint(s):  Hyperlipidemia and Medicare AWV          Subjective   History of Present Illness  The patient presents for evaluation of her cholesterol. She is accompanied by her      She is supplementing her diet with  omega-3 fatty acids.  She has  previously expressed a preference for managing her cholesterol without medication. Her eating window is from noon to 7 PM.    Past Medical History:   Diagnosis Date    Hyperlipidemia      Past Surgical History:   Procedure Laterality Date    APPENDECTOMY      BREAST BIOPSY Right     benign (doesn't know the date)    COLONOSCOPY N/A 2019    COLORECTAL CANCER SCREENING, NOT HIGH RISK performed by Reji Kohler MD at [facility]    HYSTERECTOMY (CERVIX STATUS UNKNOWN)      Total    NIMESH AND BSO (CERVIX REMOVED)       Social History     Socioeconomic History    Marital status:      Spouse name: Not on file    Number of children: Not on file    Years of education: Not on file    Highest education level: Not on file   Occupational History    Not on file   Tobacco Use    Smoking status: Never    Smokeless tobacco: Never   Vaping Use    Vaping status: Not on file   Substance and Sexual Activity    Alcohol use: Yes     Comment: socially     Drug use: Never    Sexual activity: Yes     Partners: Male     Comment:     Other Topics Concern    Not on file   Social History Narrative    Not on file     Social Drivers of Health     Financial Resource Strain: Patient Declined (2024)    Overall Financial Resource Strain (CARDIA)     Difficulty of Paying Living Expenses: Patient declined   Food Insecurity: Patient Declined (2024)    Hunger Vital Sign     Worried About Running Out of Food in the Last Year: Patient declined     Ran Out of Food in the Last Year: Patient declined   Transportation Needs: Unknown